# Patient Record
Sex: FEMALE | Race: WHITE | NOT HISPANIC OR LATINO | Employment: OTHER | URBAN - METROPOLITAN AREA
[De-identification: names, ages, dates, MRNs, and addresses within clinical notes are randomized per-mention and may not be internally consistent; named-entity substitution may affect disease eponyms.]

---

## 2018-05-20 ENCOUNTER — HOSPITAL ENCOUNTER (INPATIENT)
Facility: HOSPITAL | Age: 73
LOS: 9 days | Discharge: NURSING, REHAB OR LONG-TERM CARE | End: 2018-05-30
Attending: EMERGENCY MEDICINE | Admitting: FAMILY MEDICINE

## 2018-05-20 ENCOUNTER — APPOINTMENT (OUTPATIENT)
Dept: GENERAL RADIOLOGY | Facility: HOSPITAL | Age: 73
End: 2018-05-20

## 2018-05-20 DIAGNOSIS — I48.91 ATRIAL FIBRILLATION WITH RVR (HCC): ICD-10-CM

## 2018-05-20 DIAGNOSIS — Z74.09 IMPAIRED MOBILITY AND ADLS: ICD-10-CM

## 2018-05-20 DIAGNOSIS — J44.1 CHRONIC OBSTRUCTIVE PULMONARY DISEASE WITH ACUTE EXACERBATION (HCC): Primary | ICD-10-CM

## 2018-05-20 DIAGNOSIS — Z78.9 IMPAIRED MOBILITY AND ADLS: ICD-10-CM

## 2018-05-20 LAB
ALBUMIN SERPL-MCNC: 4.3 G/DL (ref 3.2–4.8)
ALBUMIN/GLOB SERPL: 1.3 G/DL (ref 1.5–2.5)
ALP SERPL-CCNC: 71 U/L (ref 25–100)
ALT SERPL W P-5'-P-CCNC: 12 U/L (ref 7–40)
ANION GAP SERPL CALCULATED.3IONS-SCNC: 10 MMOL/L (ref 3–11)
ARTERIAL PATENCY WRIST A: ABNORMAL
AST SERPL-CCNC: 19 U/L (ref 0–33)
ATMOSPHERIC PRESS: ABNORMAL MMHG
BASE EXCESS BLDA CALC-SCNC: 13.1 MMOL/L (ref 0–2)
BASOPHILS # BLD AUTO: 0.01 10*3/MM3 (ref 0–0.2)
BASOPHILS NFR BLD AUTO: 0.1 % (ref 0–1)
BDY SITE: ABNORMAL
BILIRUB SERPL-MCNC: 0.4 MG/DL (ref 0.3–1.2)
BNP SERPL-MCNC: 82 PG/ML (ref 0–100)
BUN BLD-MCNC: 12 MG/DL (ref 9–23)
BUN/CREAT SERPL: 20 (ref 7–25)
CALCIUM SPEC-SCNC: 9.2 MG/DL (ref 8.7–10.4)
CHLORIDE SERPL-SCNC: 90 MMOL/L (ref 99–109)
CO2 BLDA-SCNC: 46.2 MMOL/L (ref 22–33)
CO2 SERPL-SCNC: 38 MMOL/L (ref 20–31)
COHGB MFR BLD: 1.4 % (ref 0–2)
CREAT BLD-MCNC: 0.6 MG/DL (ref 0.6–1.3)
DEPRECATED RDW RBC AUTO: 49.1 FL (ref 37–54)
EOSINOPHIL # BLD AUTO: 0.11 10*3/MM3 (ref 0–0.3)
EOSINOPHIL NFR BLD AUTO: 1 % (ref 0–3)
ERYTHROCYTE [DISTWIDTH] IN BLOOD BY AUTOMATED COUNT: 13.5 % (ref 11.3–14.5)
GAS FLOW AIRWAY: 6 LPM
GFR SERPL CREATININE-BSD FRML MDRD: 98 ML/MIN/1.73
GLOBULIN UR ELPH-MCNC: 3.3 GM/DL
GLUCOSE BLD-MCNC: 105 MG/DL (ref 70–100)
HCO3 BLDA-SCNC: 43.5 MMOL/L (ref 20–26)
HCT VFR BLD AUTO: 41.5 % (ref 34.5–44)
HCT VFR BLD CALC: 41.2 %
HGB BLD-MCNC: 13.2 G/DL (ref 11.5–15.5)
HGB BLDA-MCNC: 13.4 G/DL (ref 14–18)
HOROWITZ INDEX BLD+IHG-RTO: 44 %
IMM GRANULOCYTES # BLD: 0.03 10*3/MM3 (ref 0–0.03)
IMM GRANULOCYTES NFR BLD: 0.3 % (ref 0–0.6)
INR PPP: 1.01 (ref 0.91–1.09)
LYMPHOCYTES # BLD AUTO: 2.18 10*3/MM3 (ref 0.6–4.8)
LYMPHOCYTES NFR BLD AUTO: 19.8 % (ref 24–44)
MCH RBC QN AUTO: 31.6 PG (ref 27–31)
MCHC RBC AUTO-ENTMCNC: 31.8 G/DL (ref 32–36)
MCV RBC AUTO: 99.3 FL (ref 80–99)
METHGB BLD QL: 1.1 % (ref 0–1.5)
MODALITY: ABNORMAL
MONOCYTES # BLD AUTO: 1.83 10*3/MM3 (ref 0–1)
MONOCYTES NFR BLD AUTO: 16.6 % (ref 0–12)
NEUTROPHILS # BLD AUTO: 6.85 10*3/MM3 (ref 1.5–8.3)
NEUTROPHILS NFR BLD AUTO: 62.2 % (ref 41–71)
OXYHGB MFR BLDV: 93.7 % (ref 94–99)
PCO2 BLDA: 87.9 MM HG (ref 35–45)
PH BLDA: 7.3 PH UNITS (ref 7.35–7.45)
PLATELET # BLD AUTO: 174 10*3/MM3 (ref 150–450)
PMV BLD AUTO: 10.4 FL (ref 6–12)
PO2 BLDA: 90.9 MM HG (ref 83–108)
POTASSIUM BLD-SCNC: 4 MMOL/L (ref 3.5–5.5)
PROCALCITONIN SERPL-MCNC: <0.05 NG/ML
PROT SERPL-MCNC: 7.6 G/DL (ref 5.7–8.2)
PROTHROMBIN TIME: 10.6 SECONDS (ref 9.6–11.5)
RBC # BLD AUTO: 4.18 10*6/MM3 (ref 3.89–5.14)
SODIUM BLD-SCNC: 138 MMOL/L (ref 132–146)
TROPONIN I SERPL-MCNC: 0 NG/ML (ref 0–0.07)
WBC NRBC COR # BLD: 11.01 10*3/MM3 (ref 3.5–10.8)

## 2018-05-20 PROCEDURE — 71045 X-RAY EXAM CHEST 1 VIEW: CPT

## 2018-05-20 PROCEDURE — 83880 ASSAY OF NATRIURETIC PEPTIDE: CPT | Performed by: EMERGENCY MEDICINE

## 2018-05-20 PROCEDURE — 94644 CONT INHLJ TX 1ST HOUR: CPT

## 2018-05-20 PROCEDURE — 25010000002 METHYLPREDNISOLONE PER 40 MG: Performed by: EMERGENCY MEDICINE

## 2018-05-20 PROCEDURE — 94799 UNLISTED PULMONARY SVC/PX: CPT

## 2018-05-20 PROCEDURE — 99285 EMERGENCY DEPT VISIT HI MDM: CPT

## 2018-05-20 PROCEDURE — 25010000002 CEFTRIAXONE PER 250 MG: Performed by: EMERGENCY MEDICINE

## 2018-05-20 PROCEDURE — 85610 PROTHROMBIN TIME: CPT | Performed by: EMERGENCY MEDICINE

## 2018-05-20 PROCEDURE — 82805 BLOOD GASES W/O2 SATURATION: CPT | Performed by: EMERGENCY MEDICINE

## 2018-05-20 PROCEDURE — 94660 CPAP INITIATION&MGMT: CPT

## 2018-05-20 PROCEDURE — 36600 WITHDRAWAL OF ARTERIAL BLOOD: CPT | Performed by: EMERGENCY MEDICINE

## 2018-05-20 PROCEDURE — 80053 COMPREHEN METABOLIC PANEL: CPT | Performed by: EMERGENCY MEDICINE

## 2018-05-20 PROCEDURE — 5A09457 ASSISTANCE WITH RESPIRATORY VENTILATION, 24-96 CONSECUTIVE HOURS, CONTINUOUS POSITIVE AIRWAY PRESSURE: ICD-10-PCS | Performed by: EMERGENCY MEDICINE

## 2018-05-20 PROCEDURE — 93005 ELECTROCARDIOGRAM TRACING: CPT | Performed by: EMERGENCY MEDICINE

## 2018-05-20 PROCEDURE — 85025 COMPLETE CBC W/AUTO DIFF WBC: CPT | Performed by: EMERGENCY MEDICINE

## 2018-05-20 PROCEDURE — 94640 AIRWAY INHALATION TREATMENT: CPT

## 2018-05-20 PROCEDURE — 84484 ASSAY OF TROPONIN QUANT: CPT

## 2018-05-20 PROCEDURE — 87040 BLOOD CULTURE FOR BACTERIA: CPT | Performed by: EMERGENCY MEDICINE

## 2018-05-20 PROCEDURE — 84145 PROCALCITONIN (PCT): CPT | Performed by: EMERGENCY MEDICINE

## 2018-05-20 RX ORDER — CELECOXIB 100 MG/1
200 CAPSULE ORAL DAILY
COMMUNITY

## 2018-05-20 RX ORDER — LEVOFLOXACIN 5 MG/ML
750 INJECTION, SOLUTION INTRAVENOUS ONCE
Status: COMPLETED | OUTPATIENT
Start: 2018-05-20 | End: 2018-05-21

## 2018-05-20 RX ORDER — BUDESONIDE AND FORMOTEROL FUMARATE DIHYDRATE 160; 4.5 UG/1; UG/1
2 AEROSOL RESPIRATORY (INHALATION)
Status: ON HOLD | COMMUNITY
End: 2022-01-01

## 2018-05-20 RX ORDER — ALBUTEROL SULFATE 2.5 MG/3ML
SOLUTION RESPIRATORY (INHALATION)
Status: COMPLETED
Start: 2018-05-20 | End: 2018-05-20

## 2018-05-20 RX ORDER — DULOXETIN HYDROCHLORIDE 20 MG/1
20 CAPSULE, DELAYED RELEASE ORAL DAILY
Status: ON HOLD | COMMUNITY
End: 2022-01-01

## 2018-05-20 RX ORDER — ALBUTEROL SULFATE 2.5 MG/3ML
10 SOLUTION RESPIRATORY (INHALATION) CONTINUOUS
Status: ACTIVE | OUTPATIENT
Start: 2018-05-20 | End: 2018-05-20

## 2018-05-20 RX ORDER — METHYLPREDNISOLONE SODIUM SUCCINATE 40 MG/ML
60 INJECTION, POWDER, LYOPHILIZED, FOR SOLUTION INTRAMUSCULAR; INTRAVENOUS ONCE
Status: COMPLETED | OUTPATIENT
Start: 2018-05-20 | End: 2018-05-20

## 2018-05-20 RX ORDER — ALBUTEROL SULFATE 2.5 MG/3ML
2.5 SOLUTION RESPIRATORY (INHALATION) EVERY 4 HOURS PRN
Status: ON HOLD | COMMUNITY
End: 2018-05-24

## 2018-05-20 RX ORDER — CEFTRIAXONE SODIUM 1 G/50ML
1 INJECTION, SOLUTION INTRAVENOUS ONCE
Status: COMPLETED | OUTPATIENT
Start: 2018-05-20 | End: 2018-05-20

## 2018-05-20 RX ADMIN — ALBUTEROL SULFATE 2.5 MG: 2.5 SOLUTION RESPIRATORY (INHALATION) at 22:00

## 2018-05-20 RX ADMIN — METHYLPREDNISOLONE SODIUM SUCCINATE 60 MG: 40 INJECTION, POWDER, FOR SOLUTION INTRAMUSCULAR; INTRAVENOUS at 21:43

## 2018-05-20 RX ADMIN — CEFTRIAXONE SODIUM 1 G: 1 INJECTION, SOLUTION INTRAVENOUS at 22:52

## 2018-05-21 ENCOUNTER — APPOINTMENT (OUTPATIENT)
Dept: GENERAL RADIOLOGY | Facility: HOSPITAL | Age: 73
End: 2018-05-21

## 2018-05-21 PROBLEM — J44.1 CHRONIC OBSTRUCTIVE PULMONARY DISEASE WITH ACUTE EXACERBATION: Status: ACTIVE | Noted: 2018-05-21

## 2018-05-21 PROBLEM — Z72.0 TOBACCO ABUSE: Status: ACTIVE | Noted: 2018-05-21

## 2018-05-21 PROBLEM — J96.22 ACUTE ON CHRONIC RESPIRATORY FAILURE WITH HYPOXIA AND HYPERCAPNIA: Status: ACTIVE | Noted: 2018-05-21

## 2018-05-21 PROBLEM — J96.21 ACUTE ON CHRONIC RESPIRATORY FAILURE WITH HYPOXIA AND HYPERCAPNIA: Status: ACTIVE | Noted: 2018-05-21

## 2018-05-21 LAB
ARTERIAL PATENCY WRIST A: POSITIVE
ATMOSPHERIC PRESS: ABNORMAL MMHG
BASE EXCESS BLDA CALC-SCNC: 12.4 MMOL/L (ref 0–2)
BDY SITE: ABNORMAL
CO2 BLDA-SCNC: 44.7 MMOL/L (ref 22–33)
COHGB MFR BLD: 0.9 % (ref 0–2)
EPAP: 6
HCO3 BLDA-SCNC: 42.2 MMOL/L (ref 20–26)
HCT VFR BLD CALC: 39.2 %
HGB BLDA-MCNC: 12.8 G/DL (ref 14–18)
HOROWITZ INDEX BLD+IHG-RTO: 40 %
IPAP: 16
METHGB BLD QL: 0.9 % (ref 0–1.5)
MODALITY: ABNORMAL
OXYHGB MFR BLDV: 96.7 % (ref 94–99)
PCO2 BLDA: 83.2 MM HG (ref 35–45)
PH BLDA: 7.31 PH UNITS (ref 7.35–7.45)
PO2 BLDA: 118 MM HG (ref 83–108)
SAO2 % BLDCOA: 96.7 %

## 2018-05-21 PROCEDURE — 94640 AIRWAY INHALATION TREATMENT: CPT

## 2018-05-21 PROCEDURE — 25010000002 LORAZEPAM PER 2 MG: Performed by: INTERNAL MEDICINE

## 2018-05-21 PROCEDURE — 94799 UNLISTED PULMONARY SVC/PX: CPT

## 2018-05-21 PROCEDURE — 36600 WITHDRAWAL OF ARTERIAL BLOOD: CPT | Performed by: INTERNAL MEDICINE

## 2018-05-21 PROCEDURE — 99223 1ST HOSP IP/OBS HIGH 75: CPT | Performed by: INTERNAL MEDICINE

## 2018-05-21 PROCEDURE — 25010000002 ENOXAPARIN PER 10 MG: Performed by: INTERNAL MEDICINE

## 2018-05-21 PROCEDURE — 71045 X-RAY EXAM CHEST 1 VIEW: CPT

## 2018-05-21 PROCEDURE — 82805 BLOOD GASES W/O2 SATURATION: CPT | Performed by: INTERNAL MEDICINE

## 2018-05-21 PROCEDURE — 25010000002 LORAZEPAM PER 2 MG: Performed by: NURSE PRACTITIONER

## 2018-05-21 PROCEDURE — 94660 CPAP INITIATION&MGMT: CPT

## 2018-05-21 PROCEDURE — 25010000002 METHYLPREDNISOLONE PER 40 MG: Performed by: INTERNAL MEDICINE

## 2018-05-21 PROCEDURE — 25010000002 LEVOFLOXACIN PER 250 MG: Performed by: EMERGENCY MEDICINE

## 2018-05-21 PROCEDURE — 94760 N-INVAS EAR/PLS OXIMETRY 1: CPT

## 2018-05-21 RX ORDER — BUDESONIDE AND FORMOTEROL FUMARATE DIHYDRATE 160; 4.5 UG/1; UG/1
2 AEROSOL RESPIRATORY (INHALATION)
Status: DISCONTINUED | OUTPATIENT
Start: 2018-05-21 | End: 2018-05-30 | Stop reason: HOSPADM

## 2018-05-21 RX ORDER — LORAZEPAM 2 MG/ML
0.5 INJECTION INTRAMUSCULAR ONCE
Status: COMPLETED | OUTPATIENT
Start: 2018-05-21 | End: 2018-05-21

## 2018-05-21 RX ORDER — LEVOFLOXACIN 750 MG/1
750 TABLET ORAL EVERY 24 HOURS
Status: COMPLETED | OUTPATIENT
Start: 2018-05-21 | End: 2018-05-26

## 2018-05-21 RX ORDER — CELECOXIB 100 MG/1
100 CAPSULE ORAL 2 TIMES DAILY PRN
Status: DISCONTINUED | OUTPATIENT
Start: 2018-05-21 | End: 2018-05-30 | Stop reason: HOSPADM

## 2018-05-21 RX ORDER — ALBUTEROL SULFATE 2.5 MG/3ML
2.5 SOLUTION RESPIRATORY (INHALATION) EVERY 4 HOURS PRN
Status: DISCONTINUED | OUTPATIENT
Start: 2018-05-21 | End: 2018-05-30 | Stop reason: HOSPADM

## 2018-05-21 RX ORDER — IPRATROPIUM BROMIDE AND ALBUTEROL SULFATE 2.5; .5 MG/3ML; MG/3ML
3 SOLUTION RESPIRATORY (INHALATION)
Status: DISCONTINUED | OUTPATIENT
Start: 2018-05-21 | End: 2018-05-30 | Stop reason: HOSPADM

## 2018-05-21 RX ORDER — CEFTRIAXONE SODIUM 1 G/50ML
1 INJECTION, SOLUTION INTRAVENOUS
Status: DISCONTINUED | OUTPATIENT
Start: 2018-05-21 | End: 2018-05-21

## 2018-05-21 RX ORDER — LORAZEPAM 2 MG/ML
1 INJECTION INTRAMUSCULAR ONCE
Status: DISCONTINUED | OUTPATIENT
Start: 2018-05-21 | End: 2018-05-21

## 2018-05-21 RX ORDER — METHYLPREDNISOLONE SODIUM SUCCINATE 40 MG/ML
40 INJECTION, POWDER, LYOPHILIZED, FOR SOLUTION INTRAMUSCULAR; INTRAVENOUS EVERY 8 HOURS
Status: DISCONTINUED | OUTPATIENT
Start: 2018-05-21 | End: 2018-05-24

## 2018-05-21 RX ORDER — LORAZEPAM 2 MG/ML
0.5 INJECTION INTRAMUSCULAR EVERY 6 HOURS PRN
Status: DISCONTINUED | OUTPATIENT
Start: 2018-05-21 | End: 2018-05-23

## 2018-05-21 RX ORDER — GUAIFENESIN/DEXTROMETHORPHAN 100-10MG/5
10 SYRUP ORAL EVERY 6 HOURS PRN
Status: DISCONTINUED | OUTPATIENT
Start: 2018-05-21 | End: 2018-05-30 | Stop reason: HOSPADM

## 2018-05-21 RX ORDER — DULOXETIN HYDROCHLORIDE 20 MG/1
20 CAPSULE, DELAYED RELEASE ORAL DAILY
Status: DISCONTINUED | OUTPATIENT
Start: 2018-05-22 | End: 2018-05-30 | Stop reason: HOSPADM

## 2018-05-21 RX ORDER — ACETAMINOPHEN 325 MG/1
650 TABLET ORAL EVERY 6 HOURS PRN
Status: DISCONTINUED | OUTPATIENT
Start: 2018-05-21 | End: 2018-05-30 | Stop reason: HOSPADM

## 2018-05-21 RX ORDER — IPRATROPIUM BROMIDE AND ALBUTEROL SULFATE 2.5; .5 MG/3ML; MG/3ML
3 SOLUTION RESPIRATORY (INHALATION) EVERY 4 HOURS PRN
Status: DISCONTINUED | OUTPATIENT
Start: 2018-05-21 | End: 2018-05-30 | Stop reason: HOSPADM

## 2018-05-21 RX ADMIN — METHYLPREDNISOLONE SODIUM SUCCINATE 40 MG: 40 INJECTION, POWDER, FOR SOLUTION INTRAMUSCULAR; INTRAVENOUS at 09:26

## 2018-05-21 RX ADMIN — IPRATROPIUM BROMIDE AND ALBUTEROL SULFATE 3 ML: 2.5; .5 SOLUTION RESPIRATORY (INHALATION) at 07:01

## 2018-05-21 RX ADMIN — LEVOFLOXACIN 750 MG: 5 INJECTION, SOLUTION INTRAVENOUS at 00:08

## 2018-05-21 RX ADMIN — LORAZEPAM 0.5 MG: 2 INJECTION INTRAMUSCULAR; INTRAVENOUS at 20:41

## 2018-05-21 RX ADMIN — LORAZEPAM 0.5 MG: 2 INJECTION INTRAMUSCULAR; INTRAVENOUS at 01:02

## 2018-05-21 RX ADMIN — LEVOFLOXACIN 750 MG: 750 TABLET, FILM COATED ORAL at 14:29

## 2018-05-21 RX ADMIN — ACETAMINOPHEN 650 MG: 325 TABLET ORAL at 20:43

## 2018-05-21 RX ADMIN — IPRATROPIUM BROMIDE AND ALBUTEROL SULFATE 3 ML: 2.5; .5 SOLUTION RESPIRATORY (INHALATION) at 23:28

## 2018-05-21 RX ADMIN — GUAIFENESIN AND DEXTROMETHORPHAN 10 ML: 100; 10 SYRUP ORAL at 21:31

## 2018-05-21 RX ADMIN — IPRATROPIUM BROMIDE AND ALBUTEROL SULFATE 3 ML: 2.5; .5 SOLUTION RESPIRATORY (INHALATION) at 15:50

## 2018-05-21 RX ADMIN — BUDESONIDE AND FORMOTEROL FUMARATE DIHYDRATE 2 PUFF: 160; 4.5 AEROSOL RESPIRATORY (INHALATION) at 23:28

## 2018-05-21 RX ADMIN — METHYLPREDNISOLONE SODIUM SUCCINATE 40 MG: 40 INJECTION, POWDER, FOR SOLUTION INTRAMUSCULAR; INTRAVENOUS at 17:06

## 2018-05-21 RX ADMIN — IPRATROPIUM BROMIDE AND ALBUTEROL SULFATE 3 ML: 2.5; .5 SOLUTION RESPIRATORY (INHALATION) at 10:37

## 2018-05-21 RX ADMIN — METHYLPREDNISOLONE SODIUM SUCCINATE 40 MG: 40 INJECTION, POWDER, FOR SOLUTION INTRAMUSCULAR; INTRAVENOUS at 02:17

## 2018-05-21 RX ADMIN — IPRATROPIUM BROMIDE AND ALBUTEROL SULFATE 3 ML: 2.5; .5 SOLUTION RESPIRATORY (INHALATION) at 02:59

## 2018-05-21 RX ADMIN — IPRATROPIUM BROMIDE AND ALBUTEROL SULFATE 3 ML: 2.5; .5 SOLUTION RESPIRATORY (INHALATION) at 19:07

## 2018-05-21 RX ADMIN — ENOXAPARIN SODIUM 40 MG: 40 INJECTION SUBCUTANEOUS at 14:28

## 2018-05-21 RX ADMIN — LORAZEPAM 0.5 MG: 2 INJECTION INTRAMUSCULAR; INTRAVENOUS at 21:35

## 2018-05-21 RX ADMIN — METOPROLOL TARTRATE 25 MG: 25 TABLET ORAL at 20:41

## 2018-05-22 LAB
ANION GAP SERPL CALCULATED.3IONS-SCNC: 8 MMOL/L (ref 3–11)
BUN BLD-MCNC: 17 MG/DL (ref 9–23)
BUN/CREAT SERPL: 34 (ref 7–25)
CALCIUM SPEC-SCNC: 8.7 MG/DL (ref 8.7–10.4)
CHLORIDE SERPL-SCNC: 91 MMOL/L (ref 99–109)
CO2 SERPL-SCNC: 38 MMOL/L (ref 20–31)
CREAT BLD-MCNC: 0.5 MG/DL (ref 0.6–1.3)
DEPRECATED RDW RBC AUTO: 48.4 FL (ref 37–54)
ERYTHROCYTE [DISTWIDTH] IN BLOOD BY AUTOMATED COUNT: 13.3 % (ref 11.3–14.5)
GFR SERPL CREATININE-BSD FRML MDRD: 121 ML/MIN/1.73
GLUCOSE BLD-MCNC: 115 MG/DL (ref 70–100)
HCT VFR BLD AUTO: 39.3 % (ref 34.5–44)
HGB BLD-MCNC: 12.4 G/DL (ref 11.5–15.5)
MCH RBC QN AUTO: 31.6 PG (ref 27–31)
MCHC RBC AUTO-ENTMCNC: 31.6 G/DL (ref 32–36)
MCV RBC AUTO: 100 FL (ref 80–99)
PLATELET # BLD AUTO: 175 10*3/MM3 (ref 150–450)
PMV BLD AUTO: 10.3 FL (ref 6–12)
POTASSIUM BLD-SCNC: 4.3 MMOL/L (ref 3.5–5.5)
RBC # BLD AUTO: 3.93 10*6/MM3 (ref 3.89–5.14)
SODIUM BLD-SCNC: 137 MMOL/L (ref 132–146)
WBC NRBC COR # BLD: 9.76 10*3/MM3 (ref 3.5–10.8)

## 2018-05-22 PROCEDURE — 94799 UNLISTED PULMONARY SVC/PX: CPT

## 2018-05-22 PROCEDURE — 85027 COMPLETE CBC AUTOMATED: CPT | Performed by: INTERNAL MEDICINE

## 2018-05-22 PROCEDURE — 80048 BASIC METABOLIC PNL TOTAL CA: CPT | Performed by: INTERNAL MEDICINE

## 2018-05-22 PROCEDURE — 99232 SBSQ HOSP IP/OBS MODERATE 35: CPT | Performed by: INTERNAL MEDICINE

## 2018-05-22 PROCEDURE — 25010000002 METHYLPREDNISOLONE PER 40 MG: Performed by: INTERNAL MEDICINE

## 2018-05-22 PROCEDURE — 94760 N-INVAS EAR/PLS OXIMETRY 1: CPT

## 2018-05-22 PROCEDURE — 94640 AIRWAY INHALATION TREATMENT: CPT

## 2018-05-22 PROCEDURE — 99233 SBSQ HOSP IP/OBS HIGH 50: CPT | Performed by: INTERNAL MEDICINE

## 2018-05-22 PROCEDURE — 94644 CONT INHLJ TX 1ST HOUR: CPT

## 2018-05-22 PROCEDURE — 25010000002 ENOXAPARIN PER 10 MG: Performed by: INTERNAL MEDICINE

## 2018-05-22 PROCEDURE — 25010000002 LORAZEPAM PER 2 MG: Performed by: INTERNAL MEDICINE

## 2018-05-22 PROCEDURE — 25010000002 FUROSEMIDE PER 20 MG: Performed by: INTERNAL MEDICINE

## 2018-05-22 RX ORDER — ROFLUMILAST 500 UG/1
500 TABLET ORAL DAILY
Status: DISCONTINUED | OUTPATIENT
Start: 2018-05-22 | End: 2018-05-30 | Stop reason: HOSPADM

## 2018-05-22 RX ORDER — FUROSEMIDE 10 MG/ML
60 INJECTION INTRAMUSCULAR; INTRAVENOUS ONCE
Status: COMPLETED | OUTPATIENT
Start: 2018-05-22 | End: 2018-05-22

## 2018-05-22 RX ADMIN — METHYLPREDNISOLONE SODIUM SUCCINATE 40 MG: 40 INJECTION, POWDER, FOR SOLUTION INTRAMUSCULAR; INTRAVENOUS at 09:31

## 2018-05-22 RX ADMIN — ROFLUMILAST 500 MCG: 500 TABLET ORAL at 12:44

## 2018-05-22 RX ADMIN — IPRATROPIUM BROMIDE AND ALBUTEROL SULFATE 3 ML: 2.5; .5 SOLUTION RESPIRATORY (INHALATION) at 08:31

## 2018-05-22 RX ADMIN — IPRATROPIUM BROMIDE AND ALBUTEROL SULFATE 3 ML: 2.5; .5 SOLUTION RESPIRATORY (INHALATION) at 03:10

## 2018-05-22 RX ADMIN — METOPROLOL TARTRATE 25 MG: 25 TABLET ORAL at 08:45

## 2018-05-22 RX ADMIN — METHYLPREDNISOLONE SODIUM SUCCINATE 40 MG: 40 INJECTION, POWDER, FOR SOLUTION INTRAMUSCULAR; INTRAVENOUS at 02:24

## 2018-05-22 RX ADMIN — IPRATROPIUM BROMIDE AND ALBUTEROL SULFATE 3 ML: 2.5; .5 SOLUTION RESPIRATORY (INHALATION) at 13:03

## 2018-05-22 RX ADMIN — ACETAMINOPHEN 650 MG: 325 TABLET ORAL at 14:54

## 2018-05-22 RX ADMIN — METOPROLOL TARTRATE 25 MG: 25 TABLET ORAL at 20:18

## 2018-05-22 RX ADMIN — DULOXETINE HYDROCHLORIDE 20 MG: 20 CAPSULE, DELAYED RELEASE ORAL at 08:45

## 2018-05-22 RX ADMIN — GUAIFENESIN AND DEXTROMETHORPHAN 10 ML: 100; 10 SYRUP ORAL at 14:54

## 2018-05-22 RX ADMIN — LORAZEPAM 0.5 MG: 2 INJECTION INTRAMUSCULAR; INTRAVENOUS at 20:22

## 2018-05-22 RX ADMIN — LEVOFLOXACIN 750 MG: 750 TABLET, FILM COATED ORAL at 12:44

## 2018-05-22 RX ADMIN — IPRATROPIUM BROMIDE AND ALBUTEROL SULFATE 3 ML: 2.5; .5 SOLUTION RESPIRATORY (INHALATION) at 19:31

## 2018-05-22 RX ADMIN — ACETAMINOPHEN 650 MG: 325 TABLET ORAL at 20:17

## 2018-05-22 RX ADMIN — FUROSEMIDE 60 MG: 10 INJECTION, SOLUTION INTRAMUSCULAR; INTRAVENOUS at 09:31

## 2018-05-22 RX ADMIN — IPRATROPIUM BROMIDE AND ALBUTEROL SULFATE 3 ML: 2.5; .5 SOLUTION RESPIRATORY (INHALATION) at 16:01

## 2018-05-22 RX ADMIN — METHYLPREDNISOLONE SODIUM SUCCINATE 40 MG: 40 INJECTION, POWDER, FOR SOLUTION INTRAMUSCULAR; INTRAVENOUS at 17:23

## 2018-05-22 RX ADMIN — BUDESONIDE AND FORMOTEROL FUMARATE DIHYDRATE 2 PUFF: 160; 4.5 AEROSOL RESPIRATORY (INHALATION) at 19:32

## 2018-05-22 RX ADMIN — BUDESONIDE AND FORMOTEROL FUMARATE DIHYDRATE 2 PUFF: 160; 4.5 AEROSOL RESPIRATORY (INHALATION) at 08:31

## 2018-05-22 RX ADMIN — ENOXAPARIN SODIUM 40 MG: 40 INJECTION SUBCUTANEOUS at 14:54

## 2018-05-22 RX ADMIN — LORAZEPAM 0.5 MG: 2 INJECTION INTRAMUSCULAR; INTRAVENOUS at 14:55

## 2018-05-22 RX ADMIN — GUAIFENESIN AND DEXTROMETHORPHAN 10 ML: 100; 10 SYRUP ORAL at 20:23

## 2018-05-23 ENCOUNTER — APPOINTMENT (OUTPATIENT)
Dept: GENERAL RADIOLOGY | Facility: HOSPITAL | Age: 73
End: 2018-05-23

## 2018-05-23 LAB
ANION GAP SERPL CALCULATED.3IONS-SCNC: 4 MMOL/L (ref 3–11)
BUN BLD-MCNC: 18 MG/DL (ref 9–23)
BUN/CREAT SERPL: 30 (ref 7–25)
CALCIUM SPEC-SCNC: 8.7 MG/DL (ref 8.7–10.4)
CHLORIDE SERPL-SCNC: 87 MMOL/L (ref 99–109)
CO2 SERPL-SCNC: 46 MMOL/L (ref 20–31)
CREAT BLD-MCNC: 0.6 MG/DL (ref 0.6–1.3)
GFR SERPL CREATININE-BSD FRML MDRD: 98 ML/MIN/1.73
GLUCOSE BLD-MCNC: 98 MG/DL (ref 70–100)
POTASSIUM BLD-SCNC: 3.4 MMOL/L (ref 3.5–5.5)
SODIUM BLD-SCNC: 137 MMOL/L (ref 132–146)

## 2018-05-23 PROCEDURE — 25010000002 METHYLPREDNISOLONE PER 40 MG: Performed by: INTERNAL MEDICINE

## 2018-05-23 PROCEDURE — 25010000002 ENOXAPARIN PER 10 MG: Performed by: INTERNAL MEDICINE

## 2018-05-23 PROCEDURE — 99233 SBSQ HOSP IP/OBS HIGH 50: CPT | Performed by: INTERNAL MEDICINE

## 2018-05-23 PROCEDURE — 71045 X-RAY EXAM CHEST 1 VIEW: CPT

## 2018-05-23 PROCEDURE — 94799 UNLISTED PULMONARY SVC/PX: CPT

## 2018-05-23 PROCEDURE — 94640 AIRWAY INHALATION TREATMENT: CPT

## 2018-05-23 PROCEDURE — 97166 OT EVAL MOD COMPLEX 45 MIN: CPT | Performed by: OCCUPATIONAL THERAPIST

## 2018-05-23 PROCEDURE — 80048 BASIC METABOLIC PNL TOTAL CA: CPT | Performed by: INTERNAL MEDICINE

## 2018-05-23 PROCEDURE — 25010000002 FUROSEMIDE PER 20 MG: Performed by: INTERNAL MEDICINE

## 2018-05-23 PROCEDURE — 94660 CPAP INITIATION&MGMT: CPT

## 2018-05-23 PROCEDURE — 94760 N-INVAS EAR/PLS OXIMETRY 1: CPT

## 2018-05-23 PROCEDURE — 25010000002 LORAZEPAM PER 2 MG: Performed by: INTERNAL MEDICINE

## 2018-05-23 RX ORDER — FUROSEMIDE 10 MG/ML
60 INJECTION INTRAMUSCULAR; INTRAVENOUS ONCE
Status: COMPLETED | OUTPATIENT
Start: 2018-05-23 | End: 2018-05-23

## 2018-05-23 RX ORDER — POTASSIUM CHLORIDE 750 MG/1
40 CAPSULE, EXTENDED RELEASE ORAL ONCE
Status: COMPLETED | OUTPATIENT
Start: 2018-05-23 | End: 2018-05-23

## 2018-05-23 RX ADMIN — METOPROLOL TARTRATE 25 MG: 25 TABLET ORAL at 20:44

## 2018-05-23 RX ADMIN — FUROSEMIDE 60 MG: 10 INJECTION, SOLUTION INTRAMUSCULAR; INTRAVENOUS at 14:49

## 2018-05-23 RX ADMIN — LEVOFLOXACIN 750 MG: 750 TABLET, FILM COATED ORAL at 13:07

## 2018-05-23 RX ADMIN — BUDESONIDE AND FORMOTEROL FUMARATE DIHYDRATE 2 PUFF: 160; 4.5 AEROSOL RESPIRATORY (INHALATION) at 19:59

## 2018-05-23 RX ADMIN — ROFLUMILAST 500 MCG: 500 TABLET ORAL at 13:06

## 2018-05-23 RX ADMIN — IPRATROPIUM BROMIDE AND ALBUTEROL SULFATE 3 ML: 2.5; .5 SOLUTION RESPIRATORY (INHALATION) at 06:46

## 2018-05-23 RX ADMIN — IPRATROPIUM BROMIDE AND ALBUTEROL SULFATE 3 ML: 2.5; .5 SOLUTION RESPIRATORY (INHALATION) at 04:12

## 2018-05-23 RX ADMIN — ENOXAPARIN SODIUM 40 MG: 40 INJECTION SUBCUTANEOUS at 14:49

## 2018-05-23 RX ADMIN — IPRATROPIUM BROMIDE AND ALBUTEROL SULFATE 3 ML: 2.5; .5 SOLUTION RESPIRATORY (INHALATION) at 10:30

## 2018-05-23 RX ADMIN — ACETAMINOPHEN 650 MG: 325 TABLET ORAL at 09:28

## 2018-05-23 RX ADMIN — IPRATROPIUM BROMIDE AND ALBUTEROL SULFATE 3 ML: 2.5; .5 SOLUTION RESPIRATORY (INHALATION) at 14:31

## 2018-05-23 RX ADMIN — BUDESONIDE AND FORMOTEROL FUMARATE DIHYDRATE 2 PUFF: 160; 4.5 AEROSOL RESPIRATORY (INHALATION) at 06:46

## 2018-05-23 RX ADMIN — METHYLPREDNISOLONE SODIUM SUCCINATE 40 MG: 40 INJECTION, POWDER, FOR SOLUTION INTRAMUSCULAR; INTRAVENOUS at 17:03

## 2018-05-23 RX ADMIN — POTASSIUM CHLORIDE 40 MEQ: 750 CAPSULE, EXTENDED RELEASE ORAL at 09:25

## 2018-05-23 RX ADMIN — IPRATROPIUM BROMIDE AND ALBUTEROL SULFATE 3 ML: 2.5; .5 SOLUTION RESPIRATORY (INHALATION) at 19:59

## 2018-05-23 RX ADMIN — LORAZEPAM 0.5 MG: 2 INJECTION INTRAMUSCULAR; INTRAVENOUS at 04:00

## 2018-05-23 RX ADMIN — DULOXETINE HYDROCHLORIDE 20 MG: 20 CAPSULE, DELAYED RELEASE ORAL at 09:25

## 2018-05-23 RX ADMIN — METHYLPREDNISOLONE SODIUM SUCCINATE 40 MG: 40 INJECTION, POWDER, FOR SOLUTION INTRAMUSCULAR; INTRAVENOUS at 09:25

## 2018-05-23 RX ADMIN — METOPROLOL TARTRATE 25 MG: 25 TABLET ORAL at 09:25

## 2018-05-23 RX ADMIN — IPRATROPIUM BROMIDE AND ALBUTEROL SULFATE 3 ML: 2.5; .5 SOLUTION RESPIRATORY (INHALATION) at 23:36

## 2018-05-23 RX ADMIN — METHYLPREDNISOLONE SODIUM SUCCINATE 40 MG: 40 INJECTION, POWDER, FOR SOLUTION INTRAMUSCULAR; INTRAVENOUS at 04:00

## 2018-05-24 LAB
ALBUMIN SERPL-MCNC: 4.1 G/DL (ref 3.2–4.8)
ALBUMIN/GLOB SERPL: 1.4 G/DL (ref 1.5–2.5)
ALP SERPL-CCNC: 60 U/L (ref 25–100)
ALT SERPL W P-5'-P-CCNC: 14 U/L (ref 7–40)
ANION GAP SERPL CALCULATED.3IONS-SCNC: 13 MMOL/L (ref 3–11)
ANION GAP SERPL CALCULATED.3IONS-SCNC: 4 MMOL/L (ref 3–11)
AST SERPL-CCNC: 20 U/L (ref 0–33)
BASOPHILS # BLD AUTO: 0.02 10*3/MM3 (ref 0–0.2)
BASOPHILS NFR BLD AUTO: 0.1 % (ref 0–1)
BILIRUB SERPL-MCNC: 0.3 MG/DL (ref 0.3–1.2)
BNP SERPL-MCNC: 43 PG/ML (ref 0–100)
BUN BLD-MCNC: 20 MG/DL (ref 9–23)
BUN BLD-MCNC: 20 MG/DL (ref 9–23)
BUN/CREAT SERPL: 28.6 (ref 7–25)
BUN/CREAT SERPL: 33.3 (ref 7–25)
CALCIUM SPEC-SCNC: 9 MG/DL (ref 8.7–10.4)
CALCIUM SPEC-SCNC: 9.5 MG/DL (ref 8.7–10.4)
CHLORIDE SERPL-SCNC: 83 MMOL/L (ref 99–109)
CHLORIDE SERPL-SCNC: 84 MMOL/L (ref 99–109)
CO2 SERPL-SCNC: 40 MMOL/L (ref 20–31)
CO2 SERPL-SCNC: 50 MMOL/L (ref 20–31)
CREAT BLD-MCNC: 0.6 MG/DL (ref 0.6–1.3)
CREAT BLD-MCNC: 0.7 MG/DL (ref 0.6–1.3)
DEPRECATED RDW RBC AUTO: 47.3 FL (ref 37–54)
EOSINOPHIL # BLD AUTO: 0.01 10*3/MM3 (ref 0–0.3)
EOSINOPHIL NFR BLD AUTO: 0.1 % (ref 0–3)
ERYTHROCYTE [DISTWIDTH] IN BLOOD BY AUTOMATED COUNT: 13 % (ref 11.3–14.5)
GFR SERPL CREATININE-BSD FRML MDRD: 82 ML/MIN/1.73
GFR SERPL CREATININE-BSD FRML MDRD: 98 ML/MIN/1.73
GLOBULIN UR ELPH-MCNC: 2.9 GM/DL
GLUCOSE BLD-MCNC: 123 MG/DL (ref 70–100)
GLUCOSE BLD-MCNC: 129 MG/DL (ref 70–100)
HCT VFR BLD AUTO: 44.2 % (ref 34.5–44)
HGB BLD-MCNC: 14.1 G/DL (ref 11.5–15.5)
IMM GRANULOCYTES # BLD: 0.06 10*3/MM3 (ref 0–0.03)
IMM GRANULOCYTES NFR BLD: 0.4 % (ref 0–0.6)
LYMPHOCYTES # BLD AUTO: 2.44 10*3/MM3 (ref 0.6–4.8)
LYMPHOCYTES NFR BLD AUTO: 15.2 % (ref 24–44)
MAGNESIUM SERPL-MCNC: 2 MG/DL (ref 1.3–2.7)
MCH RBC QN AUTO: 31.7 PG (ref 27–31)
MCHC RBC AUTO-ENTMCNC: 31.9 G/DL (ref 32–36)
MCV RBC AUTO: 99.3 FL (ref 80–99)
MONOCYTES # BLD AUTO: 2.59 10*3/MM3 (ref 0–1)
MONOCYTES NFR BLD AUTO: 16.1 % (ref 0–12)
NEUTROPHILS # BLD AUTO: 11 10*3/MM3 (ref 1.5–8.3)
NEUTROPHILS NFR BLD AUTO: 68.5 % (ref 41–71)
PLATELET # BLD AUTO: 249 10*3/MM3 (ref 150–450)
PMV BLD AUTO: 10.5 FL (ref 6–12)
POTASSIUM BLD-SCNC: 3.4 MMOL/L (ref 3.5–5.5)
POTASSIUM BLD-SCNC: 4 MMOL/L (ref 3.5–5.5)
PROT SERPL-MCNC: 7 G/DL (ref 5.7–8.2)
RBC # BLD AUTO: 4.45 10*6/MM3 (ref 3.89–5.14)
SODIUM BLD-SCNC: 137 MMOL/L (ref 132–146)
SODIUM BLD-SCNC: 137 MMOL/L (ref 132–146)
TROPONIN I SERPL-MCNC: <0.006 NG/ML
WBC NRBC COR # BLD: 16.06 10*3/MM3 (ref 3.5–10.8)

## 2018-05-24 PROCEDURE — 94760 N-INVAS EAR/PLS OXIMETRY 1: CPT

## 2018-05-24 PROCEDURE — 85025 COMPLETE CBC W/AUTO DIFF WBC: CPT | Performed by: NURSE PRACTITIONER

## 2018-05-24 PROCEDURE — 94799 UNLISTED PULMONARY SVC/PX: CPT

## 2018-05-24 PROCEDURE — 25010000002 ENOXAPARIN PER 10 MG: Performed by: INTERNAL MEDICINE

## 2018-05-24 PROCEDURE — 25010000002 METHYLPREDNISOLONE PER 40 MG: Performed by: INTERNAL MEDICINE

## 2018-05-24 PROCEDURE — 83880 ASSAY OF NATRIURETIC PEPTIDE: CPT | Performed by: NURSE PRACTITIONER

## 2018-05-24 PROCEDURE — 94640 AIRWAY INHALATION TREATMENT: CPT

## 2018-05-24 PROCEDURE — 97161 PT EVAL LOW COMPLEX 20 MIN: CPT

## 2018-05-24 PROCEDURE — 99233 SBSQ HOSP IP/OBS HIGH 50: CPT | Performed by: INTERNAL MEDICINE

## 2018-05-24 PROCEDURE — 25010000002 FUROSEMIDE PER 20 MG: Performed by: INTERNAL MEDICINE

## 2018-05-24 PROCEDURE — 94660 CPAP INITIATION&MGMT: CPT

## 2018-05-24 PROCEDURE — 93010 ELECTROCARDIOGRAM REPORT: CPT | Performed by: INTERNAL MEDICINE

## 2018-05-24 PROCEDURE — 80053 COMPREHEN METABOLIC PANEL: CPT | Performed by: INTERNAL MEDICINE

## 2018-05-24 PROCEDURE — 94644 CONT INHLJ TX 1ST HOUR: CPT

## 2018-05-24 PROCEDURE — 93005 ELECTROCARDIOGRAM TRACING: CPT | Performed by: INTERNAL MEDICINE

## 2018-05-24 PROCEDURE — 83735 ASSAY OF MAGNESIUM: CPT | Performed by: NURSE PRACTITIONER

## 2018-05-24 PROCEDURE — 99291 CRITICAL CARE FIRST HOUR: CPT | Performed by: NURSE PRACTITIONER

## 2018-05-24 PROCEDURE — 99232 SBSQ HOSP IP/OBS MODERATE 35: CPT | Performed by: INTERNAL MEDICINE

## 2018-05-24 PROCEDURE — 97530 THERAPEUTIC ACTIVITIES: CPT | Performed by: OCCUPATIONAL THERAPIST

## 2018-05-24 PROCEDURE — 84484 ASSAY OF TROPONIN QUANT: CPT | Performed by: NURSE PRACTITIONER

## 2018-05-24 RX ORDER — ESMOLOL HYDROCHLORIDE 10 MG/ML
50-300 INJECTION, SOLUTION INTRAVENOUS
Status: DISCONTINUED | OUTPATIENT
Start: 2018-05-24 | End: 2018-05-24

## 2018-05-24 RX ORDER — FUROSEMIDE 10 MG/ML
40 INJECTION INTRAMUSCULAR; INTRAVENOUS ONCE
Status: COMPLETED | OUTPATIENT
Start: 2018-05-24 | End: 2018-05-24

## 2018-05-24 RX ORDER — IPRATROPIUM BROMIDE AND ALBUTEROL SULFATE 2.5; .5 MG/3ML; MG/3ML
3 SOLUTION RESPIRATORY (INHALATION) EVERY 6 HOURS
COMMUNITY

## 2018-05-24 RX ORDER — GUAIFENESIN, PSEUDOEPHEDRINE HYDROCHLORIDE 600; 60 MG/1; MG/1
1 TABLET, EXTENDED RELEASE ORAL EVERY 12 HOURS
COMMUNITY
End: 2018-05-30 | Stop reason: HOSPADM

## 2018-05-24 RX ORDER — PREDNISONE 20 MG/1
40 TABLET ORAL
Status: DISCONTINUED | OUTPATIENT
Start: 2018-05-25 | End: 2018-05-26

## 2018-05-24 RX ORDER — PREDNISONE 10 MG/1
10 TABLET ORAL
Status: DISCONTINUED | OUTPATIENT
Start: 2018-06-02 | End: 2018-05-26

## 2018-05-24 RX ORDER — ESMOLOL HYDROCHLORIDE 10 MG/ML
50-300 INJECTION, SOLUTION INTRAVENOUS
Status: DISCONTINUED | OUTPATIENT
Start: 2018-05-24 | End: 2018-05-28

## 2018-05-24 RX ORDER — PREDNISONE 10 MG/1
5 TABLET ORAL
Status: DISCONTINUED | OUTPATIENT
Start: 2018-06-05 | End: 2018-05-26

## 2018-05-24 RX ORDER — HYDROXYZINE HYDROCHLORIDE 25 MG/1
25 TABLET, FILM COATED ORAL NIGHTLY PRN
Status: DISCONTINUED | OUTPATIENT
Start: 2018-05-24 | End: 2018-05-30 | Stop reason: HOSPADM

## 2018-05-24 RX ORDER — PREDNISONE 20 MG/1
20 TABLET ORAL
Status: DISCONTINUED | OUTPATIENT
Start: 2018-05-30 | End: 2018-05-26

## 2018-05-24 RX ADMIN — ROFLUMILAST 500 MCG: 500 TABLET ORAL at 08:21

## 2018-05-24 RX ADMIN — HYDROXYZINE HYDROCHLORIDE 25 MG: 25 TABLET, FILM COATED ORAL at 20:14

## 2018-05-24 RX ADMIN — IPRATROPIUM BROMIDE AND ALBUTEROL SULFATE 3 ML: 2.5; .5 SOLUTION RESPIRATORY (INHALATION) at 22:48

## 2018-05-24 RX ADMIN — ESMOLOL HYDROCHLORIDE 50 MCG/KG/MIN: 10 INJECTION INTRAVENOUS at 20:20

## 2018-05-24 RX ADMIN — DULOXETINE HYDROCHLORIDE 20 MG: 20 CAPSULE, DELAYED RELEASE ORAL at 08:21

## 2018-05-24 RX ADMIN — IPRATROPIUM BROMIDE AND ALBUTEROL SULFATE 3 ML: 2.5; .5 SOLUTION RESPIRATORY (INHALATION) at 12:19

## 2018-05-24 RX ADMIN — LEVOFLOXACIN 750 MG: 750 TABLET, FILM COATED ORAL at 11:05

## 2018-05-24 RX ADMIN — FUROSEMIDE 40 MG: 10 INJECTION, SOLUTION INTRAMUSCULAR; INTRAVENOUS at 11:05

## 2018-05-24 RX ADMIN — IPRATROPIUM BROMIDE AND ALBUTEROL SULFATE 3 ML: 2.5; .5 SOLUTION RESPIRATORY (INHALATION) at 03:12

## 2018-05-24 RX ADMIN — BUDESONIDE AND FORMOTEROL FUMARATE DIHYDRATE 2 PUFF: 160; 4.5 AEROSOL RESPIRATORY (INHALATION) at 07:16

## 2018-05-24 RX ADMIN — IPRATROPIUM BROMIDE AND ALBUTEROL SULFATE 3 ML: 2.5; .5 SOLUTION RESPIRATORY (INHALATION) at 07:16

## 2018-05-24 RX ADMIN — IPRATROPIUM BROMIDE AND ALBUTEROL SULFATE 3 ML: 2.5; .5 SOLUTION RESPIRATORY (INHALATION) at 15:34

## 2018-05-24 RX ADMIN — BUDESONIDE AND FORMOTEROL FUMARATE DIHYDRATE 2 PUFF: 160; 4.5 AEROSOL RESPIRATORY (INHALATION) at 19:23

## 2018-05-24 RX ADMIN — ENOXAPARIN SODIUM 40 MG: 40 INJECTION SUBCUTANEOUS at 14:59

## 2018-05-24 RX ADMIN — METOPROLOL TARTRATE 25 MG: 25 TABLET ORAL at 08:21

## 2018-05-24 RX ADMIN — IPRATROPIUM BROMIDE AND ALBUTEROL SULFATE 3 ML: 2.5; .5 SOLUTION RESPIRATORY (INHALATION) at 19:23

## 2018-05-24 RX ADMIN — METHYLPREDNISOLONE SODIUM SUCCINATE 40 MG: 40 INJECTION, POWDER, FOR SOLUTION INTRAMUSCULAR; INTRAVENOUS at 02:50

## 2018-05-24 RX ADMIN — METOPROLOL TARTRATE 25 MG: 25 TABLET ORAL at 20:02

## 2018-05-25 ENCOUNTER — APPOINTMENT (OUTPATIENT)
Dept: CARDIOLOGY | Facility: HOSPITAL | Age: 73
End: 2018-05-25

## 2018-05-25 ENCOUNTER — APPOINTMENT (OUTPATIENT)
Dept: GENERAL RADIOLOGY | Facility: HOSPITAL | Age: 73
End: 2018-05-25

## 2018-05-25 PROBLEM — I47.1 SVT (SUPRAVENTRICULAR TACHYCARDIA): Status: ACTIVE | Noted: 2018-05-25

## 2018-05-25 LAB
ANION GAP SERPL CALCULATED.3IONS-SCNC: 6 MMOL/L (ref 3–11)
BACTERIA SPEC AEROBE CULT: NORMAL
BACTERIA SPEC AEROBE CULT: NORMAL
BH CV ECHO MEAS - AO MAX PG (FULL): 0.45 MMHG
BH CV ECHO MEAS - AO MAX PG: 5 MMHG
BH CV ECHO MEAS - AO MEAN PG (FULL): 0.62 MMHG
BH CV ECHO MEAS - AO MEAN PG: 2.8 MMHG
BH CV ECHO MEAS - AO ROOT AREA (BSA CORRECTED): 1.9
BH CV ECHO MEAS - AO ROOT AREA: 7.5 CM^2
BH CV ECHO MEAS - AO ROOT DIAM: 3.1 CM
BH CV ECHO MEAS - AO V2 MAX: 114 CM/SEC
BH CV ECHO MEAS - AO V2 MEAN: 74.5 CM/SEC
BH CV ECHO MEAS - AO V2 VTI: 21.5 CM
BH CV ECHO MEAS - ASC AORTA: 3.1 CM
BH CV ECHO MEAS - AVA(I,A): 4.2 CM^2
BH CV ECHO MEAS - AVA(I,D): 4.2 CM^2
BH CV ECHO MEAS - AVA(V,A): 2.8 CM^2
BH CV ECHO MEAS - AVA(V,D): 2.8 CM^2
BH CV ECHO MEAS - BSA(HAYCOCK): 1.7 M^2
BH CV ECHO MEAS - BSA: 1.7 M^2
BH CV ECHO MEAS - BZI_BMI: 22.1 KILOGRAMS/M^2
BH CV ECHO MEAS - BZI_METRIC_HEIGHT: 165.1 CM
BH CV ECHO MEAS - BZI_METRIC_WEIGHT: 60.3 KG
BH CV ECHO MEAS - EDV(CUBED): 77.2 ML
BH CV ECHO MEAS - EDV(TEICH): 81.2 ML
BH CV ECHO MEAS - EF(CUBED): 80.5 %
BH CV ECHO MEAS - EF(TEICH): 73.4 %
BH CV ECHO MEAS - ESV(CUBED): 15 ML
BH CV ECHO MEAS - ESV(TEICH): 21.6 ML
BH CV ECHO MEAS - FS: 42.1 %
BH CV ECHO MEAS - IVS/LVPW: 1
BH CV ECHO MEAS - IVSD: 0.8 CM
BH CV ECHO MEAS - LA DIMENSION: 3.1 CM
BH CV ECHO MEAS - LA/AO: 1
BH CV ECHO MEAS - LV MASS(C)D: 103.9 GRAMS
BH CV ECHO MEAS - LV MASS(C)DI: 62.4 GRAMS/M^2
BH CV ECHO MEAS - LV MAX PG: 4.5 MMHG
BH CV ECHO MEAS - LV MEAN PG: 2.2 MMHG
BH CV ECHO MEAS - LV V1 MAX: 106.6 CM/SEC
BH CV ECHO MEAS - LV V1 MEAN: 65.4 CM/SEC
BH CV ECHO MEAS - LV V1 VTI: 30.1 CM
BH CV ECHO MEAS - LVIDD: 4.3 CM
BH CV ECHO MEAS - LVIDS: 2.5 CM
BH CV ECHO MEAS - LVOT AREA (M): 2.8 CM^2
BH CV ECHO MEAS - LVOT AREA: 3 CM^2
BH CV ECHO MEAS - LVOT DIAM: 1.9 CM
BH CV ECHO MEAS - LVPWD: 0.8 CM
BH CV ECHO MEAS - MV A MAX VEL: 80.9 CM/SEC
BH CV ECHO MEAS - MV DEC TIME: 0.16 SEC
BH CV ECHO MEAS - MV E MAX VEL: 79 CM/SEC
BH CV ECHO MEAS - MV E/A: 0.98
BH CV ECHO MEAS - PA ACC SLOPE: 810.2 CM/SEC^2
BH CV ECHO MEAS - PA ACC TIME: 0.14 SEC
BH CV ECHO MEAS - PA MAX PG: 5.9 MMHG
BH CV ECHO MEAS - PA PR(ACCEL): 16.4 MMHG
BH CV ECHO MEAS - PA V2 MAX: 121.1 CM/SEC
BH CV ECHO MEAS - RVDD: 1.8 CM
BH CV ECHO MEAS - SI(AO): 97.4 ML/M^2
BH CV ECHO MEAS - SI(CUBED): 37.4 ML/M^2
BH CV ECHO MEAS - SI(LVOT): 53.7 ML/M^2
BH CV ECHO MEAS - SI(TEICH): 35.8 ML/M^2
BH CV ECHO MEAS - SV(AO): 161.9 ML
BH CV ECHO MEAS - SV(CUBED): 62.2 ML
BH CV ECHO MEAS - SV(LVOT): 89.4 ML
BH CV ECHO MEAS - SV(TEICH): 59.6 ML
BH CV ECHO MEAS - TAPSE (>1.6): 1.6 CM2
BH CV ECHO MEAS - TV MAX PG: 1.5 MMHG
BH CV ECHO MEAS - TV V2 MAX: 60.7 CM/SEC
BH CV VAS BP RIGHT ARM: NORMAL MMHG
BH CV XLRA - RV BASE: 2 CM
BH CV XLRA - RV LENGTH: 4.8 CM
BH CV XLRA - RV MID: 1.9 CM
BUN BLD-MCNC: 24 MG/DL (ref 9–23)
BUN/CREAT SERPL: 40 (ref 7–25)
CALCIUM SPEC-SCNC: 8.6 MG/DL (ref 8.7–10.4)
CHLORIDE SERPL-SCNC: 80 MMOL/L (ref 99–109)
CO2 SERPL-SCNC: 48 MMOL/L (ref 20–31)
CREAT BLD-MCNC: 0.6 MG/DL (ref 0.6–1.3)
GFR SERPL CREATININE-BSD FRML MDRD: 98 ML/MIN/1.73
GLUCOSE BLD-MCNC: 99 MG/DL (ref 70–100)
MAXIMAL PREDICTED HEART RATE: 147 BPM
POTASSIUM BLD-SCNC: 3.5 MMOL/L (ref 3.5–5.5)
SODIUM BLD-SCNC: 134 MMOL/L (ref 132–146)
STRESS TARGET HR: 125 BPM
TSH SERPL DL<=0.05 MIU/L-ACNC: 0.34 MIU/ML (ref 0.35–5.35)

## 2018-05-25 PROCEDURE — 93306 TTE W/DOPPLER COMPLETE: CPT | Performed by: INTERNAL MEDICINE

## 2018-05-25 PROCEDURE — 93306 TTE W/DOPPLER COMPLETE: CPT

## 2018-05-25 PROCEDURE — 84443 ASSAY THYROID STIM HORMONE: CPT | Performed by: INTERNAL MEDICINE

## 2018-05-25 PROCEDURE — 94799 UNLISTED PULMONARY SVC/PX: CPT

## 2018-05-25 PROCEDURE — 99233 SBSQ HOSP IP/OBS HIGH 50: CPT | Performed by: INTERNAL MEDICINE

## 2018-05-25 PROCEDURE — 99223 1ST HOSP IP/OBS HIGH 75: CPT | Performed by: INTERNAL MEDICINE

## 2018-05-25 PROCEDURE — 94640 AIRWAY INHALATION TREATMENT: CPT

## 2018-05-25 PROCEDURE — 94760 N-INVAS EAR/PLS OXIMETRY 1: CPT

## 2018-05-25 PROCEDURE — 94644 CONT INHLJ TX 1ST HOUR: CPT

## 2018-05-25 PROCEDURE — 63710000001 PREDNISONE PER 1 MG: Performed by: INTERNAL MEDICINE

## 2018-05-25 PROCEDURE — 84439 ASSAY OF FREE THYROXINE: CPT | Performed by: INTERNAL MEDICINE

## 2018-05-25 PROCEDURE — 71045 X-RAY EXAM CHEST 1 VIEW: CPT

## 2018-05-25 PROCEDURE — 80048 BASIC METABOLIC PNL TOTAL CA: CPT | Performed by: INTERNAL MEDICINE

## 2018-05-25 RX ORDER — DILTIAZEM HYDROCHLORIDE 120 MG/1
120 CAPSULE, COATED, EXTENDED RELEASE ORAL
Status: DISCONTINUED | OUTPATIENT
Start: 2018-05-26 | End: 2018-05-26

## 2018-05-25 RX ADMIN — PREDNISONE 40 MG: 20 TABLET ORAL at 09:28

## 2018-05-25 RX ADMIN — ACETAMINOPHEN 650 MG: 325 TABLET ORAL at 03:39

## 2018-05-25 RX ADMIN — METOPROLOL TARTRATE 25 MG: 25 TABLET ORAL at 20:33

## 2018-05-25 RX ADMIN — IPRATROPIUM BROMIDE AND ALBUTEROL SULFATE 3 ML: 2.5; .5 SOLUTION RESPIRATORY (INHALATION) at 12:04

## 2018-05-25 RX ADMIN — METOPROLOL TARTRATE 25 MG: 25 TABLET ORAL at 09:29

## 2018-05-25 RX ADMIN — ACETAMINOPHEN 650 MG: 325 TABLET ORAL at 16:08

## 2018-05-25 RX ADMIN — DULOXETINE HYDROCHLORIDE 20 MG: 20 CAPSULE, DELAYED RELEASE ORAL at 09:29

## 2018-05-25 RX ADMIN — IPRATROPIUM BROMIDE AND ALBUTEROL SULFATE 3 ML: 2.5; .5 SOLUTION RESPIRATORY (INHALATION) at 03:46

## 2018-05-25 RX ADMIN — BUDESONIDE AND FORMOTEROL FUMARATE DIHYDRATE 2 PUFF: 160; 4.5 AEROSOL RESPIRATORY (INHALATION) at 19:32

## 2018-05-25 RX ADMIN — HYDROXYZINE HYDROCHLORIDE 25 MG: 25 TABLET, FILM COATED ORAL at 20:40

## 2018-05-25 RX ADMIN — LEVOFLOXACIN 750 MG: 750 TABLET, FILM COATED ORAL at 12:20

## 2018-05-25 RX ADMIN — IPRATROPIUM BROMIDE AND ALBUTEROL SULFATE 3 ML: 2.5; .5 SOLUTION RESPIRATORY (INHALATION) at 07:22

## 2018-05-25 RX ADMIN — IPRATROPIUM BROMIDE AND ALBUTEROL SULFATE 3 ML: 2.5; .5 SOLUTION RESPIRATORY (INHALATION) at 15:56

## 2018-05-25 RX ADMIN — BUDESONIDE AND FORMOTEROL FUMARATE DIHYDRATE 2 PUFF: 160; 4.5 AEROSOL RESPIRATORY (INHALATION) at 07:22

## 2018-05-25 RX ADMIN — APIXABAN 5 MG: 5 TABLET, FILM COATED ORAL at 20:33

## 2018-05-25 RX ADMIN — IPRATROPIUM BROMIDE AND ALBUTEROL SULFATE 3 ML: 2.5; .5 SOLUTION RESPIRATORY (INHALATION) at 19:32

## 2018-05-25 RX ADMIN — ROFLUMILAST 500 MCG: 500 TABLET ORAL at 09:29

## 2018-05-26 LAB
MAGNESIUM SERPL-MCNC: 1.7 MG/DL (ref 1.3–2.7)
T4 FREE SERPL-MCNC: 1.45 NG/DL (ref 0.89–1.76)

## 2018-05-26 PROCEDURE — 99233 SBSQ HOSP IP/OBS HIGH 50: CPT | Performed by: INTERNAL MEDICINE

## 2018-05-26 PROCEDURE — 25010000002 DIGOXIN PER 500 MCG: Performed by: INTERNAL MEDICINE

## 2018-05-26 PROCEDURE — 93010 ELECTROCARDIOGRAM REPORT: CPT | Performed by: INTERNAL MEDICINE

## 2018-05-26 PROCEDURE — 93005 ELECTROCARDIOGRAM TRACING: CPT | Performed by: INTERNAL MEDICINE

## 2018-05-26 PROCEDURE — 94760 N-INVAS EAR/PLS OXIMETRY 1: CPT

## 2018-05-26 PROCEDURE — 94640 AIRWAY INHALATION TREATMENT: CPT

## 2018-05-26 PROCEDURE — 83735 ASSAY OF MAGNESIUM: CPT | Performed by: INTERNAL MEDICINE

## 2018-05-26 PROCEDURE — 94799 UNLISTED PULMONARY SVC/PX: CPT

## 2018-05-26 PROCEDURE — 63710000001 PREDNISONE PER 1 MG: Performed by: INTERNAL MEDICINE

## 2018-05-26 RX ORDER — POTASSIUM CHLORIDE 750 MG/1
40 CAPSULE, EXTENDED RELEASE ORAL ONCE
Status: COMPLETED | OUTPATIENT
Start: 2018-05-26 | End: 2018-05-26

## 2018-05-26 RX ORDER — HYDROCODONE BITARTRATE AND ACETAMINOPHEN 5; 325 MG/1; MG/1
1 TABLET ORAL EVERY 4 HOURS PRN
Status: DISCONTINUED | OUTPATIENT
Start: 2018-05-26 | End: 2018-05-30 | Stop reason: HOSPADM

## 2018-05-26 RX ORDER — MAGNESIUM SULFATE HEPTAHYDRATE 40 MG/ML
2 INJECTION, SOLUTION INTRAVENOUS AS NEEDED
Status: DISCONTINUED | OUTPATIENT
Start: 2018-05-26 | End: 2018-05-30 | Stop reason: HOSPADM

## 2018-05-26 RX ORDER — POTASSIUM CHLORIDE 1.5 G/1.77G
40 POWDER, FOR SOLUTION ORAL AS NEEDED
Status: DISCONTINUED | OUTPATIENT
Start: 2018-05-26 | End: 2018-05-30 | Stop reason: HOSPADM

## 2018-05-26 RX ORDER — PREDNISONE 20 MG/1
20 TABLET ORAL
Status: DISCONTINUED | OUTPATIENT
Start: 2018-05-27 | End: 2018-05-29

## 2018-05-26 RX ORDER — DILTIAZEM HYDROCHLORIDE 240 MG/1
240 CAPSULE, COATED, EXTENDED RELEASE ORAL
Status: DISCONTINUED | OUTPATIENT
Start: 2018-05-27 | End: 2018-05-30 | Stop reason: HOSPADM

## 2018-05-26 RX ORDER — POTASSIUM CHLORIDE 7.45 MG/ML
10 INJECTION INTRAVENOUS
Status: DISCONTINUED | OUTPATIENT
Start: 2018-05-26 | End: 2018-05-30 | Stop reason: HOSPADM

## 2018-05-26 RX ORDER — DIGOXIN 0.25 MG/ML
250 INJECTION INTRAMUSCULAR; INTRAVENOUS EVERY 4 HOURS
Status: COMPLETED | OUTPATIENT
Start: 2018-05-26 | End: 2018-05-27

## 2018-05-26 RX ORDER — DILTIAZEM HYDROCHLORIDE 60 MG/1
120 TABLET, FILM COATED ORAL ONCE
Status: COMPLETED | OUTPATIENT
Start: 2018-05-26 | End: 2018-05-26

## 2018-05-26 RX ORDER — MAGNESIUM SULFATE HEPTAHYDRATE 40 MG/ML
4 INJECTION, SOLUTION INTRAVENOUS AS NEEDED
Status: DISCONTINUED | OUTPATIENT
Start: 2018-05-26 | End: 2018-05-30 | Stop reason: HOSPADM

## 2018-05-26 RX ORDER — DILTIAZEM HYDROCHLORIDE 180 MG/1
180 CAPSULE, COATED, EXTENDED RELEASE ORAL
Status: DISCONTINUED | OUTPATIENT
Start: 2018-05-27 | End: 2018-05-26

## 2018-05-26 RX ORDER — POTASSIUM CHLORIDE 750 MG/1
40 CAPSULE, EXTENDED RELEASE ORAL AS NEEDED
Status: DISCONTINUED | OUTPATIENT
Start: 2018-05-26 | End: 2018-05-30 | Stop reason: HOSPADM

## 2018-05-26 RX ADMIN — APIXABAN 5 MG: 5 TABLET, FILM COATED ORAL at 20:24

## 2018-05-26 RX ADMIN — HYDROCODONE BITARTRATE AND ACETAMINOPHEN 1 TABLET: 5; 325 TABLET ORAL at 20:24

## 2018-05-26 RX ADMIN — ACETAMINOPHEN 650 MG: 325 TABLET ORAL at 01:31

## 2018-05-26 RX ADMIN — IPRATROPIUM BROMIDE AND ALBUTEROL SULFATE 3 ML: 2.5; .5 SOLUTION RESPIRATORY (INHALATION) at 07:02

## 2018-05-26 RX ADMIN — LEVOFLOXACIN 750 MG: 750 TABLET, FILM COATED ORAL at 10:16

## 2018-05-26 RX ADMIN — DULOXETINE HYDROCHLORIDE 20 MG: 20 CAPSULE, DELAYED RELEASE ORAL at 08:24

## 2018-05-26 RX ADMIN — POTASSIUM CHLORIDE 40 MEQ: 750 CAPSULE, EXTENDED RELEASE ORAL at 13:16

## 2018-05-26 RX ADMIN — PREDNISONE 40 MG: 20 TABLET ORAL at 08:20

## 2018-05-26 RX ADMIN — ROFLUMILAST 500 MCG: 500 TABLET ORAL at 08:24

## 2018-05-26 RX ADMIN — DILTIAZEM HYDROCHLORIDE 120 MG: 60 TABLET, FILM COATED ORAL at 13:16

## 2018-05-26 RX ADMIN — CELECOXIB 100 MG: 100 CAPSULE ORAL at 10:15

## 2018-05-26 RX ADMIN — HYDROCODONE BITARTRATE AND ACETAMINOPHEN 1 TABLET: 5; 325 TABLET ORAL at 15:46

## 2018-05-26 RX ADMIN — HYDROCODONE BITARTRATE AND ACETAMINOPHEN 1 TABLET: 5; 325 TABLET ORAL at 11:39

## 2018-05-26 RX ADMIN — IPRATROPIUM BROMIDE AND ALBUTEROL SULFATE 3 ML: 2.5; .5 SOLUTION RESPIRATORY (INHALATION) at 23:37

## 2018-05-26 RX ADMIN — IPRATROPIUM BROMIDE AND ALBUTEROL SULFATE 3 ML: 2.5; .5 SOLUTION RESPIRATORY (INHALATION) at 14:57

## 2018-05-26 RX ADMIN — IPRATROPIUM BROMIDE AND ALBUTEROL SULFATE 3 ML: 2.5; .5 SOLUTION RESPIRATORY (INHALATION) at 03:51

## 2018-05-26 RX ADMIN — IPRATROPIUM BROMIDE AND ALBUTEROL SULFATE 3 ML: 2.5; .5 SOLUTION RESPIRATORY (INHALATION) at 19:10

## 2018-05-26 RX ADMIN — ACETAMINOPHEN 650 MG: 325 TABLET ORAL at 08:24

## 2018-05-26 RX ADMIN — BUDESONIDE AND FORMOTEROL FUMARATE DIHYDRATE 2 PUFF: 160; 4.5 AEROSOL RESPIRATORY (INHALATION) at 19:10

## 2018-05-26 RX ADMIN — DIGOXIN 250 MCG: 0.25 INJECTION INTRAMUSCULAR; INTRAVENOUS at 17:50

## 2018-05-26 RX ADMIN — IPRATROPIUM BROMIDE AND ALBUTEROL SULFATE 3 ML: 2.5; .5 SOLUTION RESPIRATORY (INHALATION) at 11:05

## 2018-05-26 RX ADMIN — APIXABAN 5 MG: 5 TABLET, FILM COATED ORAL at 08:20

## 2018-05-26 RX ADMIN — DILTIAZEM HYDROCHLORIDE 120 MG: 120 CAPSULE, COATED, EXTENDED RELEASE ORAL at 08:20

## 2018-05-26 RX ADMIN — DIGOXIN 250 MCG: 0.25 INJECTION INTRAMUSCULAR; INTRAVENOUS at 13:37

## 2018-05-26 RX ADMIN — MAGNESIUM SULFATE HEPTAHYDRATE 4 G: 40 INJECTION, SOLUTION INTRAVENOUS at 16:28

## 2018-05-26 RX ADMIN — DIGOXIN 250 MCG: 0.25 INJECTION INTRAMUSCULAR; INTRAVENOUS at 23:24

## 2018-05-26 RX ADMIN — BUDESONIDE AND FORMOTEROL FUMARATE DIHYDRATE 2 PUFF: 160; 4.5 AEROSOL RESPIRATORY (INHALATION) at 07:02

## 2018-05-27 LAB
MAGNESIUM SERPL-MCNC: 2.2 MG/DL (ref 1.3–2.7)
POTASSIUM BLD-SCNC: 4 MMOL/L (ref 3.5–5.5)

## 2018-05-27 PROCEDURE — 94640 AIRWAY INHALATION TREATMENT: CPT

## 2018-05-27 PROCEDURE — 25010000002 DIGOXIN PER 500 MCG: Performed by: INTERNAL MEDICINE

## 2018-05-27 PROCEDURE — 84132 ASSAY OF SERUM POTASSIUM: CPT | Performed by: INTERNAL MEDICINE

## 2018-05-27 PROCEDURE — 94799 UNLISTED PULMONARY SVC/PX: CPT

## 2018-05-27 PROCEDURE — 97530 THERAPEUTIC ACTIVITIES: CPT | Performed by: OCCUPATIONAL THERAPIST

## 2018-05-27 PROCEDURE — 63710000001 PREDNISONE PER 1 MG: Performed by: INTERNAL MEDICINE

## 2018-05-27 PROCEDURE — 83735 ASSAY OF MAGNESIUM: CPT | Performed by: INTERNAL MEDICINE

## 2018-05-27 PROCEDURE — 99232 SBSQ HOSP IP/OBS MODERATE 35: CPT | Performed by: INTERNAL MEDICINE

## 2018-05-27 RX ORDER — FLECAINIDE ACETATE 50 MG/1
50 TABLET ORAL EVERY 12 HOURS SCHEDULED
Status: DISCONTINUED | OUTPATIENT
Start: 2018-05-27 | End: 2018-05-30 | Stop reason: HOSPADM

## 2018-05-27 RX ORDER — CYCLOBENZAPRINE HCL 10 MG
5 TABLET ORAL 3 TIMES DAILY PRN
Status: DISCONTINUED | OUTPATIENT
Start: 2018-05-27 | End: 2018-05-30 | Stop reason: HOSPADM

## 2018-05-27 RX ADMIN — APIXABAN 5 MG: 5 TABLET, FILM COATED ORAL at 20:49

## 2018-05-27 RX ADMIN — HYDROCODONE BITARTRATE AND ACETAMINOPHEN 1 TABLET: 5; 325 TABLET ORAL at 20:49

## 2018-05-27 RX ADMIN — FLECAINIDE ACETATE 50 MG: 50 TABLET ORAL at 20:49

## 2018-05-27 RX ADMIN — BUDESONIDE AND FORMOTEROL FUMARATE DIHYDRATE 2 PUFF: 160; 4.5 AEROSOL RESPIRATORY (INHALATION) at 18:47

## 2018-05-27 RX ADMIN — DIGOXIN 250 MCG: 0.25 INJECTION INTRAMUSCULAR; INTRAVENOUS at 03:52

## 2018-05-27 RX ADMIN — HYDROCODONE BITARTRATE AND ACETAMINOPHEN 1 TABLET: 5; 325 TABLET ORAL at 15:12

## 2018-05-27 RX ADMIN — IPRATROPIUM BROMIDE AND ALBUTEROL SULFATE 3 ML: 2.5; .5 SOLUTION RESPIRATORY (INHALATION) at 06:16

## 2018-05-27 RX ADMIN — IPRATROPIUM BROMIDE AND ALBUTEROL SULFATE 3 ML: 2.5; .5 SOLUTION RESPIRATORY (INHALATION) at 14:56

## 2018-05-27 RX ADMIN — DULOXETINE HYDROCHLORIDE 20 MG: 20 CAPSULE, DELAYED RELEASE ORAL at 09:05

## 2018-05-27 RX ADMIN — CYCLOBENZAPRINE HYDROCHLORIDE 5 MG: 10 TABLET, FILM COATED ORAL at 22:36

## 2018-05-27 RX ADMIN — FLECAINIDE ACETATE 50 MG: 50 TABLET ORAL at 15:12

## 2018-05-27 RX ADMIN — IPRATROPIUM BROMIDE AND ALBUTEROL SULFATE 3 ML: 2.5; .5 SOLUTION RESPIRATORY (INHALATION) at 10:34

## 2018-05-27 RX ADMIN — HYDROCODONE BITARTRATE AND ACETAMINOPHEN 1 TABLET: 5; 325 TABLET ORAL at 01:28

## 2018-05-27 RX ADMIN — APIXABAN 5 MG: 5 TABLET, FILM COATED ORAL at 09:01

## 2018-05-27 RX ADMIN — IPRATROPIUM BROMIDE AND ALBUTEROL SULFATE 3 ML: 2.5; .5 SOLUTION RESPIRATORY (INHALATION) at 18:47

## 2018-05-27 RX ADMIN — ROFLUMILAST 500 MCG: 500 TABLET ORAL at 09:05

## 2018-05-27 RX ADMIN — HYDROCODONE BITARTRATE AND ACETAMINOPHEN 1 TABLET: 5; 325 TABLET ORAL at 09:01

## 2018-05-27 RX ADMIN — DILTIAZEM HYDROCHLORIDE 240 MG: 240 CAPSULE, COATED, EXTENDED RELEASE ORAL at 09:01

## 2018-05-27 RX ADMIN — BUDESONIDE AND FORMOTEROL FUMARATE DIHYDRATE 2 PUFF: 160; 4.5 AEROSOL RESPIRATORY (INHALATION) at 06:16

## 2018-05-27 RX ADMIN — PREDNISONE 20 MG: 20 TABLET ORAL at 09:01

## 2018-05-28 LAB — MAGNESIUM SERPL-MCNC: 2.2 MG/DL (ref 1.3–2.7)

## 2018-05-28 PROCEDURE — 63710000001 PREDNISONE PER 1 MG: Performed by: INTERNAL MEDICINE

## 2018-05-28 PROCEDURE — 97110 THERAPEUTIC EXERCISES: CPT

## 2018-05-28 PROCEDURE — 93010 ELECTROCARDIOGRAM REPORT: CPT | Performed by: INTERNAL MEDICINE

## 2018-05-28 PROCEDURE — 83735 ASSAY OF MAGNESIUM: CPT

## 2018-05-28 PROCEDURE — 97530 THERAPEUTIC ACTIVITIES: CPT

## 2018-05-28 PROCEDURE — 97116 GAIT TRAINING THERAPY: CPT

## 2018-05-28 PROCEDURE — 94644 CONT INHLJ TX 1ST HOUR: CPT

## 2018-05-28 PROCEDURE — 93005 ELECTROCARDIOGRAM TRACING: CPT | Performed by: INTERNAL MEDICINE

## 2018-05-28 PROCEDURE — 99232 SBSQ HOSP IP/OBS MODERATE 35: CPT | Performed by: INTERNAL MEDICINE

## 2018-05-28 PROCEDURE — 94640 AIRWAY INHALATION TREATMENT: CPT

## 2018-05-28 PROCEDURE — 94799 UNLISTED PULMONARY SVC/PX: CPT

## 2018-05-28 RX ADMIN — IPRATROPIUM BROMIDE AND ALBUTEROL SULFATE 3 ML: 2.5; .5 SOLUTION RESPIRATORY (INHALATION) at 16:09

## 2018-05-28 RX ADMIN — APIXABAN 5 MG: 5 TABLET, FILM COATED ORAL at 20:51

## 2018-05-28 RX ADMIN — DULOXETINE HYDROCHLORIDE 20 MG: 20 CAPSULE, DELAYED RELEASE ORAL at 09:57

## 2018-05-28 RX ADMIN — ROFLUMILAST 500 MCG: 500 TABLET ORAL at 09:57

## 2018-05-28 RX ADMIN — FLECAINIDE ACETATE 50 MG: 50 TABLET ORAL at 20:51

## 2018-05-28 RX ADMIN — IPRATROPIUM BROMIDE AND ALBUTEROL SULFATE 3 ML: 2.5; .5 SOLUTION RESPIRATORY (INHALATION) at 12:43

## 2018-05-28 RX ADMIN — HYDROCODONE BITARTRATE AND ACETAMINOPHEN 1 TABLET: 5; 325 TABLET ORAL at 09:58

## 2018-05-28 RX ADMIN — PREDNISONE 20 MG: 20 TABLET ORAL at 09:58

## 2018-05-28 RX ADMIN — DILTIAZEM HYDROCHLORIDE 240 MG: 240 CAPSULE, COATED, EXTENDED RELEASE ORAL at 09:57

## 2018-05-28 RX ADMIN — FLECAINIDE ACETATE 50 MG: 50 TABLET ORAL at 09:57

## 2018-05-28 RX ADMIN — IPRATROPIUM BROMIDE AND ALBUTEROL SULFATE 3 ML: 2.5; .5 SOLUTION RESPIRATORY (INHALATION) at 18:52

## 2018-05-28 RX ADMIN — IPRATROPIUM BROMIDE AND ALBUTEROL SULFATE 3 ML: 2.5; .5 SOLUTION RESPIRATORY (INHALATION) at 07:18

## 2018-05-28 RX ADMIN — BUDESONIDE AND FORMOTEROL FUMARATE DIHYDRATE 2 PUFF: 160; 4.5 AEROSOL RESPIRATORY (INHALATION) at 18:52

## 2018-05-28 RX ADMIN — BUDESONIDE AND FORMOTEROL FUMARATE DIHYDRATE 2 PUFF: 160; 4.5 AEROSOL RESPIRATORY (INHALATION) at 07:18

## 2018-05-28 RX ADMIN — CYCLOBENZAPRINE HYDROCHLORIDE 5 MG: 10 TABLET, FILM COATED ORAL at 22:14

## 2018-05-28 RX ADMIN — APIXABAN 5 MG: 5 TABLET, FILM COATED ORAL at 09:57

## 2018-05-28 RX ADMIN — HYDROCODONE BITARTRATE AND ACETAMINOPHEN 1 TABLET: 5; 325 TABLET ORAL at 20:51

## 2018-05-28 RX ADMIN — HYDROCODONE BITARTRATE AND ACETAMINOPHEN 1 TABLET: 5; 325 TABLET ORAL at 15:09

## 2018-05-28 RX ADMIN — HYDROCODONE BITARTRATE AND ACETAMINOPHEN 1 TABLET: 5; 325 TABLET ORAL at 06:18

## 2018-05-29 PROCEDURE — 94799 UNLISTED PULMONARY SVC/PX: CPT

## 2018-05-29 PROCEDURE — 99232 SBSQ HOSP IP/OBS MODERATE 35: CPT | Performed by: INTERNAL MEDICINE

## 2018-05-29 PROCEDURE — 93005 ELECTROCARDIOGRAM TRACING: CPT | Performed by: INTERNAL MEDICINE

## 2018-05-29 PROCEDURE — 94640 AIRWAY INHALATION TREATMENT: CPT

## 2018-05-29 PROCEDURE — 97530 THERAPEUTIC ACTIVITIES: CPT

## 2018-05-29 PROCEDURE — 63710000001 PREDNISONE PER 1 MG: Performed by: INTERNAL MEDICINE

## 2018-05-29 PROCEDURE — 97116 GAIT TRAINING THERAPY: CPT

## 2018-05-29 PROCEDURE — 97110 THERAPEUTIC EXERCISES: CPT

## 2018-05-29 PROCEDURE — 93010 ELECTROCARDIOGRAM REPORT: CPT | Performed by: INTERNAL MEDICINE

## 2018-05-29 RX ORDER — SENNA AND DOCUSATE SODIUM 50; 8.6 MG/1; MG/1
2 TABLET, FILM COATED ORAL 2 TIMES DAILY
Status: DISCONTINUED | OUTPATIENT
Start: 2018-05-29 | End: 2018-05-30 | Stop reason: HOSPADM

## 2018-05-29 RX ORDER — PREDNISONE 10 MG/1
10 TABLET ORAL
Status: DISCONTINUED | OUTPATIENT
Start: 2018-05-30 | End: 2018-05-30 | Stop reason: HOSPADM

## 2018-05-29 RX ADMIN — IPRATROPIUM BROMIDE AND ALBUTEROL SULFATE 3 ML: 2.5; .5 SOLUTION RESPIRATORY (INHALATION) at 18:42

## 2018-05-29 RX ADMIN — PREDNISONE 20 MG: 20 TABLET ORAL at 08:13

## 2018-05-29 RX ADMIN — IPRATROPIUM BROMIDE AND ALBUTEROL SULFATE 3 ML: 2.5; .5 SOLUTION RESPIRATORY (INHALATION) at 15:15

## 2018-05-29 RX ADMIN — APIXABAN 5 MG: 5 TABLET, FILM COATED ORAL at 21:31

## 2018-05-29 RX ADMIN — IPRATROPIUM BROMIDE AND ALBUTEROL SULFATE 3 ML: 2.5; .5 SOLUTION RESPIRATORY (INHALATION) at 10:31

## 2018-05-29 RX ADMIN — DULOXETINE HYDROCHLORIDE 20 MG: 20 CAPSULE, DELAYED RELEASE ORAL at 08:13

## 2018-05-29 RX ADMIN — Medication 2 TABLET: at 13:31

## 2018-05-29 RX ADMIN — BUDESONIDE AND FORMOTEROL FUMARATE DIHYDRATE 2 PUFF: 160; 4.5 AEROSOL RESPIRATORY (INHALATION) at 18:42

## 2018-05-29 RX ADMIN — BUDESONIDE AND FORMOTEROL FUMARATE DIHYDRATE 2 PUFF: 160; 4.5 AEROSOL RESPIRATORY (INHALATION) at 06:36

## 2018-05-29 RX ADMIN — FLECAINIDE ACETATE 50 MG: 50 TABLET ORAL at 21:31

## 2018-05-29 RX ADMIN — FLECAINIDE ACETATE 50 MG: 50 TABLET ORAL at 08:13

## 2018-05-29 RX ADMIN — APIXABAN 5 MG: 5 TABLET, FILM COATED ORAL at 08:13

## 2018-05-29 RX ADMIN — IPRATROPIUM BROMIDE AND ALBUTEROL SULFATE 3 ML: 2.5; .5 SOLUTION RESPIRATORY (INHALATION) at 23:58

## 2018-05-29 RX ADMIN — IPRATROPIUM BROMIDE AND ALBUTEROL SULFATE 3 ML: 2.5; .5 SOLUTION RESPIRATORY (INHALATION) at 06:36

## 2018-05-29 RX ADMIN — ROFLUMILAST 500 MCG: 500 TABLET ORAL at 08:13

## 2018-05-29 RX ADMIN — HYDROCODONE BITARTRATE AND ACETAMINOPHEN 1 TABLET: 5; 325 TABLET ORAL at 12:06

## 2018-05-29 RX ADMIN — POLYETHYLENE GLYCOL (3350) 17 G: 17 POWDER, FOR SOLUTION ORAL at 13:31

## 2018-05-29 RX ADMIN — DILTIAZEM HYDROCHLORIDE 240 MG: 240 CAPSULE, COATED, EXTENDED RELEASE ORAL at 08:13

## 2018-05-29 RX ADMIN — HYDROCODONE BITARTRATE AND ACETAMINOPHEN 1 TABLET: 5; 325 TABLET ORAL at 21:31

## 2018-05-29 RX ADMIN — Medication 2 TABLET: at 21:31

## 2018-05-30 VITALS
WEIGHT: 133 LBS | BODY MASS INDEX: 22.16 KG/M2 | RESPIRATION RATE: 16 BRPM | DIASTOLIC BLOOD PRESSURE: 58 MMHG | TEMPERATURE: 98.6 F | HEIGHT: 65 IN | HEART RATE: 80 BPM | SYSTOLIC BLOOD PRESSURE: 121 MMHG | OXYGEN SATURATION: 93 %

## 2018-05-30 PROBLEM — J96.22 ACUTE ON CHRONIC RESPIRATORY FAILURE WITH HYPOXIA AND HYPERCAPNIA (HCC): Status: RESOLVED | Noted: 2018-05-21 | Resolved: 2018-05-30

## 2018-05-30 PROBLEM — I47.1 SVT (SUPRAVENTRICULAR TACHYCARDIA): Status: RESOLVED | Noted: 2018-05-25 | Resolved: 2018-05-30

## 2018-05-30 PROBLEM — J96.21 ACUTE ON CHRONIC RESPIRATORY FAILURE WITH HYPOXIA AND HYPERCAPNIA (HCC): Status: RESOLVED | Noted: 2018-05-21 | Resolved: 2018-05-30

## 2018-05-30 PROCEDURE — 63710000001 PREDNISONE PER 5 MG: Performed by: INTERNAL MEDICINE

## 2018-05-30 PROCEDURE — 94640 AIRWAY INHALATION TREATMENT: CPT

## 2018-05-30 PROCEDURE — 94799 UNLISTED PULMONARY SVC/PX: CPT

## 2018-05-30 PROCEDURE — 94760 N-INVAS EAR/PLS OXIMETRY 1: CPT

## 2018-05-30 PROCEDURE — 99239 HOSP IP/OBS DSCHRG MGMT >30: CPT | Performed by: NURSE PRACTITIONER

## 2018-05-30 RX ORDER — SENNA AND DOCUSATE SODIUM 50; 8.6 MG/1; MG/1
2 TABLET, FILM COATED ORAL 2 TIMES DAILY
Status: ON HOLD
Start: 2018-05-30 | End: 2022-01-01

## 2018-05-30 RX ORDER — CYCLOBENZAPRINE HCL 5 MG
5 TABLET ORAL 3 TIMES DAILY PRN
Status: ON HOLD
Start: 2018-05-30 | End: 2022-01-01

## 2018-05-30 RX ORDER — GUAIFENESIN/DEXTROMETHORPHAN 100-10MG/5
10 SYRUP ORAL EVERY 6 HOURS PRN
Status: ON HOLD
Start: 2018-05-30 | End: 2022-01-01

## 2018-05-30 RX ORDER — HYDROCODONE BITARTRATE AND ACETAMINOPHEN 5; 325 MG/1; MG/1
1 TABLET ORAL EVERY 6 HOURS PRN
Status: ON HOLD
Start: 2018-05-30 | End: 2022-01-01

## 2018-05-30 RX ORDER — ROFLUMILAST 500 UG/1
500 TABLET ORAL DAILY
Status: ON HOLD
Start: 2018-05-31 | End: 2022-01-01

## 2018-05-30 RX ORDER — BISACODYL 10 MG
10 SUPPOSITORY, RECTAL RECTAL DAILY PRN
Status: DISCONTINUED | OUTPATIENT
Start: 2018-05-30 | End: 2018-05-30 | Stop reason: HOSPADM

## 2018-05-30 RX ORDER — HYDROXYZINE HYDROCHLORIDE 25 MG/1
25 TABLET, FILM COATED ORAL NIGHTLY PRN
Status: ON HOLD
Start: 2018-05-30 | End: 2022-01-01

## 2018-05-30 RX ORDER — PREDNISONE 10 MG/1
10 TABLET ORAL
Start: 2018-05-31 | End: 2018-06-02

## 2018-05-30 RX ORDER — BISACODYL 10 MG
10 SUPPOSITORY, RECTAL RECTAL ONCE
Status: COMPLETED | OUTPATIENT
Start: 2018-05-30 | End: 2018-05-30

## 2018-05-30 RX ORDER — ALBUTEROL SULFATE 2.5 MG/3ML
2.5 SOLUTION RESPIRATORY (INHALATION) EVERY 4 HOURS PRN
Refills: 12 | Status: ON HOLD
Start: 2018-05-30 | End: 2022-01-01

## 2018-05-30 RX ORDER — ACETAMINOPHEN 325 MG/1
650 TABLET ORAL EVERY 6 HOURS PRN
Status: ON HOLD
Start: 2018-05-30 | End: 2022-01-01

## 2018-05-30 RX ORDER — FLECAINIDE ACETATE 50 MG/1
50 TABLET ORAL EVERY 12 HOURS SCHEDULED
Start: 2018-05-30

## 2018-05-30 RX ORDER — DILTIAZEM HYDROCHLORIDE 240 MG/1
240 CAPSULE, COATED, EXTENDED RELEASE ORAL
Status: ON HOLD
Start: 2018-05-31 | End: 2022-01-01

## 2018-05-30 RX ADMIN — IPRATROPIUM BROMIDE AND ALBUTEROL SULFATE 3 ML: 2.5; .5 SOLUTION RESPIRATORY (INHALATION) at 03:47

## 2018-05-30 RX ADMIN — BISACODYL 10 MG: 10 SUPPOSITORY RECTAL at 11:34

## 2018-05-30 RX ADMIN — Medication 2 TABLET: at 08:30

## 2018-05-30 RX ADMIN — POLYETHYLENE GLYCOL (3350) 17 G: 17 POWDER, FOR SOLUTION ORAL at 08:30

## 2018-05-30 RX ADMIN — DILTIAZEM HYDROCHLORIDE 240 MG: 240 CAPSULE, COATED, EXTENDED RELEASE ORAL at 08:30

## 2018-05-30 RX ADMIN — BUDESONIDE AND FORMOTEROL FUMARATE DIHYDRATE 2 PUFF: 160; 4.5 AEROSOL RESPIRATORY (INHALATION) at 07:04

## 2018-05-30 RX ADMIN — APIXABAN 5 MG: 5 TABLET, FILM COATED ORAL at 08:30

## 2018-05-30 RX ADMIN — IPRATROPIUM BROMIDE AND ALBUTEROL SULFATE 3 ML: 2.5; .5 SOLUTION RESPIRATORY (INHALATION) at 07:04

## 2018-05-30 RX ADMIN — IPRATROPIUM BROMIDE AND ALBUTEROL SULFATE 3 ML: 2.5; .5 SOLUTION RESPIRATORY (INHALATION) at 15:48

## 2018-05-30 RX ADMIN — DULOXETINE HYDROCHLORIDE 20 MG: 20 CAPSULE, DELAYED RELEASE ORAL at 08:30

## 2018-05-30 RX ADMIN — FLECAINIDE ACETATE 50 MG: 50 TABLET ORAL at 08:30

## 2018-05-30 RX ADMIN — IPRATROPIUM BROMIDE AND ALBUTEROL SULFATE 3 ML: 2.5; .5 SOLUTION RESPIRATORY (INHALATION) at 12:18

## 2018-05-30 RX ADMIN — PREDNISONE 10 MG: 10 TABLET ORAL at 08:30

## 2018-05-30 RX ADMIN — CYCLOBENZAPRINE HYDROCHLORIDE 5 MG: 10 TABLET, FILM COATED ORAL at 00:40

## 2018-05-30 RX ADMIN — ROFLUMILAST 500 MCG: 500 TABLET ORAL at 08:30

## 2022-01-01 ENCOUNTER — APPOINTMENT (OUTPATIENT)
Dept: GENERAL RADIOLOGY | Facility: HOSPITAL | Age: 77
End: 2022-01-01

## 2022-01-01 ENCOUNTER — HOSPITAL ENCOUNTER (INPATIENT)
Facility: HOSPITAL | Age: 77
LOS: 5 days | End: 2022-08-08
Attending: INTERNAL MEDICINE | Admitting: INTERNAL MEDICINE

## 2022-01-01 ENCOUNTER — APPOINTMENT (OUTPATIENT)
Dept: CT IMAGING | Facility: HOSPITAL | Age: 77
End: 2022-01-01

## 2022-01-01 ENCOUNTER — HOSPITAL ENCOUNTER (INPATIENT)
Facility: HOSPITAL | Age: 77
LOS: 10 days | Discharge: HOSPICE | End: 2022-08-03
Attending: INTERNAL MEDICINE | Admitting: INTERNAL MEDICINE

## 2022-01-01 VITALS
DIASTOLIC BLOOD PRESSURE: 67 MMHG | TEMPERATURE: 98.1 F | RESPIRATION RATE: 17 BRPM | OXYGEN SATURATION: 93 % | HEART RATE: 76 BPM | SYSTOLIC BLOOD PRESSURE: 165 MMHG

## 2022-01-01 VITALS
WEIGHT: 126.54 LBS | TEMPERATURE: 98.2 F | HEART RATE: 71 BPM | OXYGEN SATURATION: 93 % | RESPIRATION RATE: 20 BRPM | DIASTOLIC BLOOD PRESSURE: 80 MMHG | BODY MASS INDEX: 20.34 KG/M2 | SYSTOLIC BLOOD PRESSURE: 183 MMHG | HEIGHT: 66 IN

## 2022-01-01 LAB
ALBUMIN SERPL-MCNC: 4.3 G/DL (ref 3.5–5.2)
ALBUMIN/GLOB SERPL: 1.6 G/DL
ALP SERPL-CCNC: 56 U/L (ref 39–117)
ALT SERPL W P-5'-P-CCNC: 11 U/L (ref 1–33)
AMPHET+METHAMPHET UR QL: NEGATIVE
AMPHETAMINES UR QL: NEGATIVE
ANION GAP SERPL CALCULATED.3IONS-SCNC: -1 MMOL/L (ref 5–15)
ANION GAP SERPL CALCULATED.3IONS-SCNC: -2 MMOL/L (ref 5–15)
ANION GAP SERPL CALCULATED.3IONS-SCNC: -2 MMOL/L (ref 5–15)
ANION GAP SERPL CALCULATED.3IONS-SCNC: 1 MMOL/L (ref 5–15)
ANION GAP SERPL CALCULATED.3IONS-SCNC: 2 MMOL/L (ref 5–15)
ANION GAP SERPL CALCULATED.3IONS-SCNC: 3 MMOL/L (ref 5–15)
ANION GAP SERPL CALCULATED.3IONS-SCNC: ABNORMAL MMOL/L
ARTERIAL PATENCY WRIST A: ABNORMAL
ARTERIAL PATENCY WRIST A: POSITIVE
AST SERPL-CCNC: 22 U/L (ref 1–32)
ATMOSPHERIC PRESS: ABNORMAL MM[HG]
B PARAPERT DNA SPEC QL NAA+PROBE: NOT DETECTED
B PERT DNA SPEC QL NAA+PROBE: NOT DETECTED
BACTERIA SPEC RESP CULT: ABNORMAL
BACTERIA SPEC RESP CULT: ABNORMAL
BACTERIA UR QL AUTO: NORMAL /HPF
BARBITURATES UR QL SCN: NEGATIVE
BASE EXCESS BLDA CALC-SCNC: 17.7 MMOL/L (ref 0–2)
BASE EXCESS BLDA CALC-SCNC: 18.8 MMOL/L (ref 0–2)
BASE EXCESS BLDA CALC-SCNC: 19.1 MMOL/L (ref 0–2)
BASE EXCESS BLDA CALC-SCNC: 23.2 MMOL/L (ref 0–2)
BASOPHILS # BLD AUTO: 0.01 10*3/MM3 (ref 0–0.2)
BASOPHILS NFR BLD AUTO: 0.1 % (ref 0–1.5)
BDY SITE: ABNORMAL
BENZODIAZ UR QL SCN: NEGATIVE
BILIRUB SERPL-MCNC: 0.5 MG/DL (ref 0–1.2)
BILIRUB UR QL STRIP: NEGATIVE
BODY TEMPERATURE: 37 C
BUN SERPL-MCNC: 18 MG/DL (ref 8–23)
BUN SERPL-MCNC: 18 MG/DL (ref 8–23)
BUN SERPL-MCNC: 21 MG/DL (ref 8–23)
BUN SERPL-MCNC: 21 MG/DL (ref 8–23)
BUN SERPL-MCNC: 22 MG/DL (ref 8–23)
BUN SERPL-MCNC: 24 MG/DL (ref 8–23)
BUN SERPL-MCNC: 25 MG/DL (ref 8–23)
BUN SERPL-MCNC: 26 MG/DL (ref 8–23)
BUN SERPL-MCNC: 27 MG/DL (ref 8–23)
BUN SERPL-MCNC: 29 MG/DL (ref 8–23)
BUN SERPL-MCNC: 30 MG/DL (ref 8–23)
BUN/CREAT SERPL: 32.8 (ref 7–25)
BUN/CREAT SERPL: 34 (ref 7–25)
BUN/CREAT SERPL: 34.4 (ref 7–25)
BUN/CREAT SERPL: 37.3 (ref 7–25)
BUN/CREAT SERPL: 40.9 (ref 7–25)
BUN/CREAT SERPL: 45 (ref 7–25)
BUN/CREAT SERPL: 48.1 (ref 7–25)
BUN/CREAT SERPL: 48.3 (ref 7–25)
BUN/CREAT SERPL: 49 (ref 7–25)
BUN/CREAT SERPL: 56.8 (ref 7–25)
BUN/CREAT SERPL: 68.2 (ref 7–25)
BUPRENORPHINE SERPL-MCNC: NEGATIVE NG/ML
C PNEUM DNA NPH QL NAA+NON-PROBE: NOT DETECTED
CA-I SERPL ISE-MCNC: 1.22 MMOL/L (ref 1.12–1.32)
CA-I SERPL ISE-MCNC: 1.23 MMOL/L (ref 1.12–1.32)
CALCIUM SPEC-SCNC: 8.5 MG/DL (ref 8.6–10.5)
CALCIUM SPEC-SCNC: 8.6 MG/DL (ref 8.6–10.5)
CALCIUM SPEC-SCNC: 8.7 MG/DL (ref 8.6–10.5)
CALCIUM SPEC-SCNC: 8.8 MG/DL (ref 8.6–10.5)
CALCIUM SPEC-SCNC: 8.8 MG/DL (ref 8.6–10.5)
CALCIUM SPEC-SCNC: 8.9 MG/DL (ref 8.6–10.5)
CALCIUM SPEC-SCNC: 8.9 MG/DL (ref 8.6–10.5)
CALCIUM SPEC-SCNC: 9 MG/DL (ref 8.6–10.5)
CALCIUM SPEC-SCNC: 9 MG/DL (ref 8.6–10.5)
CALCIUM SPEC-SCNC: 9.3 MG/DL (ref 8.6–10.5)
CALCIUM SPEC-SCNC: 9.3 MG/DL (ref 8.6–10.5)
CANNABINOIDS SERPL QL: NEGATIVE
CHLORIDE SERPL-SCNC: 82 MMOL/L (ref 98–107)
CHLORIDE SERPL-SCNC: 82 MMOL/L (ref 98–107)
CHLORIDE SERPL-SCNC: 84 MMOL/L (ref 98–107)
CHLORIDE SERPL-SCNC: 87 MMOL/L (ref 98–107)
CHLORIDE SERPL-SCNC: 89 MMOL/L (ref 98–107)
CHLORIDE SERPL-SCNC: 91 MMOL/L (ref 98–107)
CHLORIDE SERPL-SCNC: 92 MMOL/L (ref 98–107)
CHLORIDE SERPL-SCNC: 93 MMOL/L (ref 98–107)
CHLORIDE SERPL-SCNC: 94 MMOL/L (ref 98–107)
CHLORIDE SERPL-SCNC: 94 MMOL/L (ref 98–107)
CHLORIDE SERPL-SCNC: 95 MMOL/L (ref 98–107)
CLARITY UR: ABNORMAL
CO2 BLDA-SCNC: 48.1 MMOL/L (ref 22–33)
CO2 BLDA-SCNC: 50.8 MMOL/L (ref 22–33)
CO2 BLDA-SCNC: 52.9 MMOL/L (ref 22–33)
CO2 BLDA-SCNC: 54.3 MMOL/L (ref 22–33)
CO2 SERPL-SCNC: 41 MMOL/L (ref 22–29)
CO2 SERPL-SCNC: 41 MMOL/L (ref 22–29)
CO2 SERPL-SCNC: 44 MMOL/L (ref 22–29)
CO2 SERPL-SCNC: 45 MMOL/L (ref 22–29)
CO2 SERPL-SCNC: 46 MMOL/L (ref 22–29)
CO2 SERPL-SCNC: 48 MMOL/L (ref 22–29)
CO2 SERPL-SCNC: 48 MMOL/L (ref 22–29)
CO2 SERPL-SCNC: 50 MMOL/L (ref 22–29)
CO2 SERPL-SCNC: >50 MMOL/L (ref 22–29)
COCAINE UR QL: NEGATIVE
COHGB MFR BLD: 1 % (ref 0–2)
COHGB MFR BLD: 1.1 % (ref 0–2)
COHGB MFR BLD: 1.9 % (ref 0–2)
COHGB MFR BLD: 2.1 % (ref 0–2)
COLOR UR: YELLOW
CREAT SERPL-MCNC: 0.44 MG/DL (ref 0.57–1)
CREAT SERPL-MCNC: 0.49 MG/DL (ref 0.57–1)
CREAT SERPL-MCNC: 0.53 MG/DL (ref 0.57–1)
CREAT SERPL-MCNC: 0.54 MG/DL (ref 0.57–1)
CREAT SERPL-MCNC: 0.59 MG/DL (ref 0.57–1)
CREAT SERPL-MCNC: 0.6 MG/DL (ref 0.57–1)
CREAT SERPL-MCNC: 0.6 MG/DL (ref 0.57–1)
CREAT SERPL-MCNC: 0.61 MG/DL (ref 0.57–1)
CREAT SERPL-MCNC: 0.64 MG/DL (ref 0.57–1)
D-LACTATE SERPL-SCNC: 1.2 MMOL/L (ref 0.5–2)
DEPRECATED RDW RBC AUTO: 44.9 FL (ref 37–54)
DEPRECATED RDW RBC AUTO: 45.1 FL (ref 37–54)
DEPRECATED RDW RBC AUTO: 45.6 FL (ref 37–54)
DEPRECATED RDW RBC AUTO: 46.4 FL (ref 37–54)
DEPRECATED RDW RBC AUTO: 46.7 FL (ref 37–54)
DEPRECATED RDW RBC AUTO: 47.3 FL (ref 37–54)
DEPRECATED RDW RBC AUTO: 47.8 FL (ref 37–54)
DEPRECATED RDW RBC AUTO: 48.6 FL (ref 37–54)
DEPRECATED RDW RBC AUTO: 50 FL (ref 37–54)
DEPRECATED RDW RBC AUTO: 51 FL (ref 37–54)
EGFRCR SERPLBLD CKD-EPI 2021: 91.2 ML/MIN/1.73
EGFRCR SERPLBLD CKD-EPI 2021: 92.2 ML/MIN/1.73
EGFRCR SERPLBLD CKD-EPI 2021: 92.6 ML/MIN/1.73
EGFRCR SERPLBLD CKD-EPI 2021: 92.6 ML/MIN/1.73
EGFRCR SERPLBLD CKD-EPI 2021: 93 ML/MIN/1.73
EGFRCR SERPLBLD CKD-EPI 2021: 95 ML/MIN/1.73
EGFRCR SERPLBLD CKD-EPI 2021: 95.4 ML/MIN/1.73
EGFRCR SERPLBLD CKD-EPI 2021: 97.2 ML/MIN/1.73
EGFRCR SERPLBLD CKD-EPI 2021: 99.8 ML/MIN/1.73
EOSINOPHIL # BLD AUTO: 0.06 10*3/MM3 (ref 0–0.4)
EOSINOPHIL NFR BLD AUTO: 0.4 % (ref 0.3–6.2)
EPAP: 0
EPAP: 8
ERYTHROCYTE [DISTWIDTH] IN BLOOD BY AUTOMATED COUNT: 12 % (ref 12.3–15.4)
ERYTHROCYTE [DISTWIDTH] IN BLOOD BY AUTOMATED COUNT: 12.2 % (ref 12.3–15.4)
ERYTHROCYTE [DISTWIDTH] IN BLOOD BY AUTOMATED COUNT: 12.5 % (ref 12.3–15.4)
ERYTHROCYTE [DISTWIDTH] IN BLOOD BY AUTOMATED COUNT: 12.6 % (ref 12.3–15.4)
ERYTHROCYTE [DISTWIDTH] IN BLOOD BY AUTOMATED COUNT: 12.7 % (ref 12.3–15.4)
ERYTHROCYTE [DISTWIDTH] IN BLOOD BY AUTOMATED COUNT: 12.8 % (ref 12.3–15.4)
ERYTHROCYTE [DISTWIDTH] IN BLOOD BY AUTOMATED COUNT: 13.1 % (ref 12.3–15.4)
ERYTHROCYTE [DISTWIDTH] IN BLOOD BY AUTOMATED COUNT: 13.2 % (ref 12.3–15.4)
ERYTHROCYTE [DISTWIDTH] IN BLOOD BY AUTOMATED COUNT: 13.3 % (ref 12.3–15.4)
ERYTHROCYTE [DISTWIDTH] IN BLOOD BY AUTOMATED COUNT: 13.4 % (ref 12.3–15.4)
FLUAV SUBTYP SPEC NAA+PROBE: NOT DETECTED
FLUBV RNA ISLT QL NAA+PROBE: NOT DETECTED
GLOBULIN UR ELPH-MCNC: 2.7 GM/DL
GLUCOSE BLDC GLUCOMTR-MCNC: 104 MG/DL (ref 70–130)
GLUCOSE BLDC GLUCOMTR-MCNC: 109 MG/DL (ref 70–130)
GLUCOSE SERPL-MCNC: 106 MG/DL (ref 65–99)
GLUCOSE SERPL-MCNC: 109 MG/DL (ref 65–99)
GLUCOSE SERPL-MCNC: 111 MG/DL (ref 65–99)
GLUCOSE SERPL-MCNC: 112 MG/DL (ref 65–99)
GLUCOSE SERPL-MCNC: 118 MG/DL (ref 65–99)
GLUCOSE SERPL-MCNC: 126 MG/DL (ref 65–99)
GLUCOSE SERPL-MCNC: 71 MG/DL (ref 65–99)
GLUCOSE SERPL-MCNC: 82 MG/DL (ref 65–99)
GLUCOSE SERPL-MCNC: 87 MG/DL (ref 65–99)
GLUCOSE SERPL-MCNC: 90 MG/DL (ref 65–99)
GLUCOSE SERPL-MCNC: 91 MG/DL (ref 65–99)
GLUCOSE UR STRIP-MCNC: NEGATIVE MG/DL
GRAM STN SPEC: ABNORMAL
HADV DNA SPEC NAA+PROBE: NOT DETECTED
HCO3 BLDA-SCNC: 46 MMOL/L (ref 20–26)
HCO3 BLDA-SCNC: 47.9 MMOL/L (ref 20–26)
HCO3 BLDA-SCNC: 49.2 MMOL/L (ref 20–26)
HCO3 BLDA-SCNC: 51.5 MMOL/L (ref 20–26)
HCOV 229E RNA SPEC QL NAA+PROBE: NOT DETECTED
HCOV HKU1 RNA SPEC QL NAA+PROBE: NOT DETECTED
HCOV NL63 RNA SPEC QL NAA+PROBE: NOT DETECTED
HCOV OC43 RNA SPEC QL NAA+PROBE: NOT DETECTED
HCT VFR BLD AUTO: 25.5 % (ref 34–46.6)
HCT VFR BLD AUTO: 25.8 % (ref 34–46.6)
HCT VFR BLD AUTO: 26.3 % (ref 34–46.6)
HCT VFR BLD AUTO: 26.8 % (ref 34–46.6)
HCT VFR BLD AUTO: 27.2 % (ref 34–46.6)
HCT VFR BLD AUTO: 27.6 % (ref 34–46.6)
HCT VFR BLD AUTO: 28.4 % (ref 34–46.6)
HCT VFR BLD AUTO: 28.5 % (ref 34–46.6)
HCT VFR BLD AUTO: 29 % (ref 34–46.6)
HCT VFR BLD AUTO: 34 % (ref 34–46.6)
HCT VFR BLD CALC: 25 % (ref 38–51)
HCT VFR BLD CALC: 28.1 % (ref 38–51)
HCT VFR BLD CALC: 28.4 % (ref 38–51)
HCT VFR BLD CALC: 29.8 % (ref 38–51)
HGB BLD-MCNC: 10.9 G/DL (ref 12–15.9)
HGB BLD-MCNC: 8.2 G/DL (ref 12–15.9)
HGB BLD-MCNC: 8.5 G/DL (ref 12–15.9)
HGB BLD-MCNC: 8.6 G/DL (ref 12–15.9)
HGB BLD-MCNC: 8.6 G/DL (ref 12–15.9)
HGB BLD-MCNC: 8.8 G/DL (ref 12–15.9)
HGB BLD-MCNC: 8.8 G/DL (ref 12–15.9)
HGB BLD-MCNC: 8.9 G/DL (ref 12–15.9)
HGB BLD-MCNC: 9.1 G/DL (ref 12–15.9)
HGB BLD-MCNC: 9.2 G/DL (ref 12–15.9)
HGB BLDA-MCNC: 8.2 G/DL (ref 14–18)
HGB BLDA-MCNC: 9.2 G/DL (ref 14–18)
HGB BLDA-MCNC: 9.3 G/DL (ref 14–18)
HGB BLDA-MCNC: 9.7 G/DL (ref 14–18)
HGB UR QL STRIP.AUTO: NEGATIVE
HMPV RNA NPH QL NAA+NON-PROBE: NOT DETECTED
HPIV1 RNA ISLT QL NAA+PROBE: NOT DETECTED
HPIV2 RNA SPEC QL NAA+PROBE: NOT DETECTED
HPIV3 RNA NPH QL NAA+PROBE: NOT DETECTED
HPIV4 P GENE NPH QL NAA+PROBE: NOT DETECTED
HYALINE CASTS UR QL AUTO: NORMAL /LPF
IMM GRANULOCYTES # BLD AUTO: 0.08 10*3/MM3 (ref 0–0.05)
IMM GRANULOCYTES NFR BLD AUTO: 0.6 % (ref 0–0.5)
INHALED O2 CONCENTRATION: 40 %
INHALED O2 CONCENTRATION: 60 %
INR PPP: 1.06 (ref 0.84–1.13)
INR PPP: 1.13 (ref 0.84–1.13)
INR PPP: 1.13 (ref 0.84–1.13)
INR PPP: 1.16 (ref 0.84–1.13)
INR PPP: 1.2 (ref 0.84–1.13)
INR PPP: 1.23 (ref 0.84–1.13)
INR PPP: 1.23 (ref 0.84–1.13)
INR PPP: 1.24 (ref 0.84–1.13)
IPAP: 0
IPAP: 18
KETONES UR QL STRIP: ABNORMAL
LEUKOCYTE ESTERASE UR QL STRIP.AUTO: NEGATIVE
LYMPHOCYTES # BLD AUTO: 0.71 10*3/MM3 (ref 0.7–3.1)
LYMPHOCYTES NFR BLD AUTO: 5.2 % (ref 19.6–45.3)
Lab: ABNORMAL
M PNEUMO IGG SER IA-ACNC: NOT DETECTED
MAGNESIUM SERPL-MCNC: 1.8 MG/DL (ref 1.6–2.4)
MAGNESIUM SERPL-MCNC: 2.1 MG/DL (ref 1.6–2.4)
MAGNESIUM SERPL-MCNC: 2.2 MG/DL (ref 1.6–2.4)
MCH RBC QN AUTO: 31.8 PG (ref 26.6–33)
MCH RBC QN AUTO: 32.1 PG (ref 26.6–33)
MCH RBC QN AUTO: 32.1 PG (ref 26.6–33)
MCH RBC QN AUTO: 32.2 PG (ref 26.6–33)
MCH RBC QN AUTO: 32.3 PG (ref 26.6–33)
MCH RBC QN AUTO: 32.4 PG (ref 26.6–33)
MCH RBC QN AUTO: 32.4 PG (ref 26.6–33)
MCH RBC QN AUTO: 33.1 PG (ref 26.6–33)
MCHC RBC AUTO-ENTMCNC: 31.2 G/DL (ref 31.5–35.7)
MCHC RBC AUTO-ENTMCNC: 31.7 G/DL (ref 31.5–35.7)
MCHC RBC AUTO-ENTMCNC: 31.8 G/DL (ref 31.5–35.7)
MCHC RBC AUTO-ENTMCNC: 31.9 G/DL (ref 31.5–35.7)
MCHC RBC AUTO-ENTMCNC: 32 G/DL (ref 31.5–35.7)
MCHC RBC AUTO-ENTMCNC: 32.1 G/DL (ref 31.5–35.7)
MCHC RBC AUTO-ENTMCNC: 32.1 G/DL (ref 31.5–35.7)
MCHC RBC AUTO-ENTMCNC: 32.4 G/DL (ref 31.5–35.7)
MCHC RBC AUTO-ENTMCNC: 32.7 G/DL (ref 31.5–35.7)
MCHC RBC AUTO-ENTMCNC: 33.3 G/DL (ref 31.5–35.7)
MCV RBC AUTO: 100 FL (ref 79–97)
MCV RBC AUTO: 100.3 FL (ref 79–97)
MCV RBC AUTO: 100.4 FL (ref 79–97)
MCV RBC AUTO: 100.7 FL (ref 79–97)
MCV RBC AUTO: 100.7 FL (ref 79–97)
MCV RBC AUTO: 101.2 FL (ref 79–97)
MCV RBC AUTO: 103.3 FL (ref 79–97)
MCV RBC AUTO: 103.3 FL (ref 79–97)
MCV RBC AUTO: 97.3 FL (ref 79–97)
MCV RBC AUTO: 98.1 FL (ref 79–97)
METHADONE UR QL SCN: NEGATIVE
METHGB BLD QL: 0.2 % (ref 0–1.5)
METHGB BLD QL: 0.2 % (ref 0–1.5)
METHGB BLD QL: 0.4 % (ref 0–1.5)
METHGB BLD QL: 0.6 % (ref 0–1.5)
MODALITY: ABNORMAL
MONOCYTES # BLD AUTO: 1.52 10*3/MM3 (ref 0.1–0.9)
MONOCYTES NFR BLD AUTO: 11.1 % (ref 5–12)
MRSA DNA SPEC QL NAA+PROBE: NEGATIVE
NEUTROPHILS NFR BLD AUTO: 11.35 10*3/MM3 (ref 1.7–7)
NEUTROPHILS NFR BLD AUTO: 82.6 % (ref 42.7–76)
NITRITE UR QL STRIP: NEGATIVE
NOTE: ABNORMAL
NOTIFIED BY: ABNORMAL
NOTIFIED WHO: ABNORMAL
NRBC BLD AUTO-RTO: 0 /100 WBC (ref 0–0.2)
NT-PROBNP SERPL-MCNC: 922.4 PG/ML (ref 0–1800)
OPIATES UR QL: NEGATIVE
OXYCODONE UR QL SCN: NEGATIVE
OXYHGB MFR BLDV: 88.1 % (ref 94–99)
OXYHGB MFR BLDV: 95.3 % (ref 94–99)
OXYHGB MFR BLDV: 95.3 % (ref 94–99)
OXYHGB MFR BLDV: 97.4 % (ref 94–99)
PAW @ PEAK INSP FLOW SETTING VENT: 0 CMH2O
PCO2 BLDA: 69 MM HG (ref 35–45)
PCO2 BLDA: 88.8 MM HG (ref 35–45)
PCO2 BLDA: 91.9 MM HG (ref 35–45)
PCO2 BLDA: ABNORMAL MM[HG]
PCO2 TEMP ADJ BLD: 69 MM HG (ref 35–45)
PCO2 TEMP ADJ BLD: 88.8 MM HG (ref 35–45)
PCO2 TEMP ADJ BLD: 91.9 MM HG (ref 35–45)
PCO2 TEMP ADJ BLD: ABNORMAL MM[HG]
PCP UR QL SCN: NEGATIVE
PH BLDA: 7.22 PH UNITS (ref 7.35–7.45)
PH BLDA: 7.33 PH UNITS (ref 7.35–7.45)
PH BLDA: 7.37 PH UNITS (ref 7.35–7.45)
PH BLDA: 7.43 PH UNITS (ref 7.35–7.45)
PH UR STRIP.AUTO: 5.5 [PH] (ref 5–8)
PH, TEMP CORRECTED: 7.22 PH UNITS
PH, TEMP CORRECTED: 7.33 PH UNITS
PH, TEMP CORRECTED: 7.37 PH UNITS
PH, TEMP CORRECTED: 7.43 PH UNITS
PHOSPHATE SERPL-MCNC: 3.4 MG/DL (ref 2.5–4.5)
PHOSPHATE SERPL-MCNC: 4.1 MG/DL (ref 2.5–4.5)
PHOSPHATE SERPL-MCNC: 4.5 MG/DL (ref 2.5–4.5)
PLATELET # BLD AUTO: 137 10*3/MM3 (ref 140–450)
PLATELET # BLD AUTO: 140 10*3/MM3 (ref 140–450)
PLATELET # BLD AUTO: 151 10*3/MM3 (ref 140–450)
PLATELET # BLD AUTO: 167 10*3/MM3 (ref 140–450)
PLATELET # BLD AUTO: 184 10*3/MM3 (ref 140–450)
PLATELET # BLD AUTO: 186 10*3/MM3 (ref 140–450)
PLATELET # BLD AUTO: 197 10*3/MM3 (ref 140–450)
PLATELET # BLD AUTO: 208 10*3/MM3 (ref 140–450)
PLATELET # BLD AUTO: 225 10*3/MM3 (ref 140–450)
PLATELET # BLD AUTO: 229 10*3/MM3 (ref 140–450)
PMV BLD AUTO: 10 FL (ref 6–12)
PMV BLD AUTO: 10.4 FL (ref 6–12)
PMV BLD AUTO: 10.5 FL (ref 6–12)
PMV BLD AUTO: 10.7 FL (ref 6–12)
PMV BLD AUTO: 10.7 FL (ref 6–12)
PMV BLD AUTO: 10.8 FL (ref 6–12)
PMV BLD AUTO: 10.9 FL (ref 6–12)
PMV BLD AUTO: 11.3 FL (ref 6–12)
PMV BLD AUTO: 9.7 FL (ref 6–12)
PMV BLD AUTO: 9.7 FL (ref 6–12)
PO2 BLDA: 101 MM HG (ref 83–108)
PO2 BLDA: 59.6 MM HG (ref 83–108)
PO2 BLDA: 82.2 MM HG (ref 83–108)
PO2 BLDA: 97.7 MM HG (ref 83–108)
PO2 TEMP ADJ BLD: 101 MM HG (ref 83–108)
PO2 TEMP ADJ BLD: 59.6 MM HG (ref 83–108)
PO2 TEMP ADJ BLD: 82.2 MM HG (ref 83–108)
PO2 TEMP ADJ BLD: 97.7 MM HG (ref 83–108)
POTASSIUM SERPL-SCNC: 3.8 MMOL/L (ref 3.5–5.2)
POTASSIUM SERPL-SCNC: 3.9 MMOL/L (ref 3.5–5.2)
POTASSIUM SERPL-SCNC: 3.9 MMOL/L (ref 3.5–5.2)
POTASSIUM SERPL-SCNC: 4 MMOL/L (ref 3.5–5.2)
POTASSIUM SERPL-SCNC: 4.1 MMOL/L (ref 3.5–5.2)
POTASSIUM SERPL-SCNC: 4.4 MMOL/L (ref 3.5–5.2)
POTASSIUM SERPL-SCNC: 4.6 MMOL/L (ref 3.5–5.2)
POTASSIUM SERPL-SCNC: 4.6 MMOL/L (ref 3.5–5.2)
POTASSIUM SERPL-SCNC: 4.7 MMOL/L (ref 3.5–5.2)
POTASSIUM SERPL-SCNC: 5 MMOL/L (ref 3.5–5.2)
POTASSIUM SERPL-SCNC: 5.1 MMOL/L (ref 3.5–5.2)
PROCALCITONIN SERPL-MCNC: 0.06 NG/ML (ref 0–0.25)
PROCALCITONIN SERPL-MCNC: 0.08 NG/ML (ref 0–0.25)
PROPOXYPH UR QL: NEGATIVE
PROT SERPL-MCNC: 7 G/DL (ref 6–8.5)
PROT UR QL STRIP: ABNORMAL
PROTHROMBIN TIME: 13.7 SECONDS (ref 11.4–14.4)
PROTHROMBIN TIME: 14.4 SECONDS (ref 11.4–14.4)
PROTHROMBIN TIME: 14.4 SECONDS (ref 11.4–14.4)
PROTHROMBIN TIME: 14.8 SECONDS (ref 11.4–14.4)
PROTHROMBIN TIME: 15.1 SECONDS (ref 11.4–14.4)
PROTHROMBIN TIME: 15.4 SECONDS (ref 11.4–14.4)
PROTHROMBIN TIME: 15.4 SECONDS (ref 11.4–14.4)
PROTHROMBIN TIME: 15.6 SECONDS (ref 11.4–14.4)
QT INTERVAL: 404 MS
QT INTERVAL: 404 MS
QTC INTERVAL: 399 MS
QTC INTERVAL: 423 MS
RBC # BLD AUTO: 2.55 10*6/MM3 (ref 3.77–5.28)
RBC # BLD AUTO: 2.62 10*6/MM3 (ref 3.77–5.28)
RBC # BLD AUTO: 2.67 10*6/MM3 (ref 3.77–5.28)
RBC # BLD AUTO: 2.68 10*6/MM3 (ref 3.77–5.28)
RBC # BLD AUTO: 2.72 10*6/MM3 (ref 3.77–5.28)
RBC # BLD AUTO: 2.74 10*6/MM3 (ref 3.77–5.28)
RBC # BLD AUTO: 2.76 10*6/MM3 (ref 3.77–5.28)
RBC # BLD AUTO: 2.82 10*6/MM3 (ref 3.77–5.28)
RBC # BLD AUTO: 2.89 10*6/MM3 (ref 3.77–5.28)
RBC # BLD AUTO: 3.29 10*6/MM3 (ref 3.77–5.28)
RBC # UR STRIP: NORMAL /HPF
REF LAB TEST METHOD: NORMAL
RHINOVIRUS RNA SPEC NAA+PROBE: NOT DETECTED
RSV RNA NPH QL NAA+NON-PROBE: NOT DETECTED
SARS-COV-2 RNA NPH QL NAA+NON-PROBE: NOT DETECTED
SET MECH RESP RATE: 16
SODIUM SERPL-SCNC: 127 MMOL/L (ref 136–145)
SODIUM SERPL-SCNC: 129 MMOL/L (ref 136–145)
SODIUM SERPL-SCNC: 132 MMOL/L (ref 136–145)
SODIUM SERPL-SCNC: 136 MMOL/L (ref 136–145)
SODIUM SERPL-SCNC: 137 MMOL/L (ref 136–145)
SODIUM SERPL-SCNC: 138 MMOL/L (ref 136–145)
SODIUM SERPL-SCNC: 139 MMOL/L (ref 136–145)
SODIUM SERPL-SCNC: 140 MMOL/L (ref 136–145)
SP GR UR STRIP: 1.02 (ref 1–1.03)
SQUAMOUS #/AREA URNS HPF: NORMAL /HPF
T4 FREE SERPL-MCNC: 1.4 NG/DL (ref 0.93–1.7)
TOTAL RATE: 0 BREATHS/MINUTE
TOTAL RATE: 20 BREATHS/MINUTE
TRICYCLICS UR QL SCN: NEGATIVE
TROPONIN T SERPL-MCNC: 0.02 NG/ML (ref 0–0.03)
TSH SERPL DL<=0.05 MIU/L-ACNC: 0.22 UIU/ML (ref 0.27–4.2)
UROBILINOGEN UR QL STRIP: ABNORMAL
VANCOMYCIN SERPL-MCNC: 15.8 MCG/ML (ref 5–40)
VENTILATOR MODE: ABNORMAL
VENTILATOR MODE: ABNORMAL
WBC # UR STRIP: NORMAL /HPF
WBC NRBC COR # BLD: 10 10*3/MM3 (ref 3.4–10.8)
WBC NRBC COR # BLD: 10.79 10*3/MM3 (ref 3.4–10.8)
WBC NRBC COR # BLD: 11.01 10*3/MM3 (ref 3.4–10.8)
WBC NRBC COR # BLD: 13.73 10*3/MM3 (ref 3.4–10.8)
WBC NRBC COR # BLD: 13.76 10*3/MM3 (ref 3.4–10.8)
WBC NRBC COR # BLD: 14.93 10*3/MM3 (ref 3.4–10.8)
WBC NRBC COR # BLD: 16.41 10*3/MM3 (ref 3.4–10.8)
WBC NRBC COR # BLD: 17.06 10*3/MM3 (ref 3.4–10.8)
WBC NRBC COR # BLD: 17.46 10*3/MM3 (ref 3.4–10.8)
WBC NRBC COR # BLD: 6.41 10*3/MM3 (ref 3.4–10.8)

## 2022-01-01 PROCEDURE — 99232 SBSQ HOSP IP/OBS MODERATE 35: CPT | Performed by: INTERNAL MEDICINE

## 2022-01-01 PROCEDURE — 63710000001 PREDNISONE PER 1 MG: Performed by: INTERNAL MEDICINE

## 2022-01-01 PROCEDURE — 85610 PROTHROMBIN TIME: CPT | Performed by: INTERNAL MEDICINE

## 2022-01-01 PROCEDURE — 25010000002 LEVOFLOXACIN PER 250 MG: Performed by: INTERNAL MEDICINE

## 2022-01-01 PROCEDURE — 94668 MNPJ CHEST WALL SBSQ: CPT

## 2022-01-01 PROCEDURE — 80048 BASIC METABOLIC PNL TOTAL CA: CPT | Performed by: INTERNAL MEDICINE

## 2022-01-01 PROCEDURE — 94799 UNLISTED PULMONARY SVC/PX: CPT

## 2022-01-01 PROCEDURE — 94669 MECHANICAL CHEST WALL OSCILL: CPT

## 2022-01-01 PROCEDURE — 83735 ASSAY OF MAGNESIUM: CPT | Performed by: NURSE PRACTITIONER

## 2022-01-01 PROCEDURE — 93005 ELECTROCARDIOGRAM TRACING: CPT | Performed by: INTERNAL MEDICINE

## 2022-01-01 PROCEDURE — 87641 MR-STAPH DNA AMP PROBE: CPT

## 2022-01-01 PROCEDURE — 25010000002 VANCOMYCIN 10 G RECONSTITUTED SOLUTION

## 2022-01-01 PROCEDURE — 94664 DEMO&/EVAL PT USE INHALER: CPT

## 2022-01-01 PROCEDURE — 97530 THERAPEUTIC ACTIVITIES: CPT

## 2022-01-01 PROCEDURE — 80306 DRUG TEST PRSMV INSTRMNT: CPT | Performed by: NURSE PRACTITIONER

## 2022-01-01 PROCEDURE — 25010000002 ENOXAPARIN PER 10 MG: Performed by: NURSE PRACTITIONER

## 2022-01-01 PROCEDURE — 0202U NFCT DS 22 TRGT SARS-COV-2: CPT | Performed by: NURSE PRACTITIONER

## 2022-01-01 PROCEDURE — 25010000002 KETOROLAC TROMETHAMINE PER 15 MG: Performed by: INTERNAL MEDICINE

## 2022-01-01 PROCEDURE — 94761 N-INVAS EAR/PLS OXIMETRY MLT: CPT

## 2022-01-01 PROCEDURE — 85027 COMPLETE CBC AUTOMATED: CPT | Performed by: INTERNAL MEDICINE

## 2022-01-01 PROCEDURE — 36600 WITHDRAWAL OF ARTERIAL BLOOD: CPT

## 2022-01-01 PROCEDURE — 84145 PROCALCITONIN (PCT): CPT | Performed by: INTERNAL MEDICINE

## 2022-01-01 PROCEDURE — 82375 ASSAY CARBOXYHB QUANT: CPT

## 2022-01-01 PROCEDURE — 71045 X-RAY EXAM CHEST 1 VIEW: CPT

## 2022-01-01 PROCEDURE — 63710000001 PREDNISONE PER 1 MG: Performed by: NURSE PRACTITIONER

## 2022-01-01 PROCEDURE — 94667 MNPJ CHEST WALL 1ST: CPT

## 2022-01-01 PROCEDURE — 94660 CPAP INITIATION&MGMT: CPT

## 2022-01-01 PROCEDURE — 99291 CRITICAL CARE FIRST HOUR: CPT | Performed by: INTERNAL MEDICINE

## 2022-01-01 PROCEDURE — 82805 BLOOD GASES W/O2 SATURATION: CPT

## 2022-01-01 PROCEDURE — 83605 ASSAY OF LACTIC ACID: CPT | Performed by: NURSE PRACTITIONER

## 2022-01-01 PROCEDURE — 85610 PROTHROMBIN TIME: CPT | Performed by: NURSE PRACTITIONER

## 2022-01-01 PROCEDURE — 25010000002 DIAZEPAM PER 5 MG: Performed by: INTERNAL MEDICINE

## 2022-01-01 PROCEDURE — 97162 PT EVAL MOD COMPLEX 30 MIN: CPT

## 2022-01-01 PROCEDURE — 84439 ASSAY OF FREE THYROXINE: CPT | Performed by: NURSE PRACTITIONER

## 2022-01-01 PROCEDURE — 99231 SBSQ HOSP IP/OBS SF/LOW 25: CPT | Performed by: INTERNAL MEDICINE

## 2022-01-01 PROCEDURE — 82962 GLUCOSE BLOOD TEST: CPT

## 2022-01-01 PROCEDURE — 25010000002 ACETAZOLAMIDE PER 500 MG: Performed by: INTERNAL MEDICINE

## 2022-01-01 PROCEDURE — 25010000002 MORPHINE PER 10 MG: Performed by: INTERNAL MEDICINE

## 2022-01-01 PROCEDURE — 25010000002 PIPERACILLIN SOD-TAZOBACTAM PER 1 G: Performed by: INTERNAL MEDICINE

## 2022-01-01 PROCEDURE — 99238 HOSP IP/OBS DSCHRG MGMT 30/<: CPT | Performed by: INTERNAL MEDICINE

## 2022-01-01 PROCEDURE — 82330 ASSAY OF CALCIUM: CPT | Performed by: NURSE PRACTITIONER

## 2022-01-01 PROCEDURE — 84100 ASSAY OF PHOSPHORUS: CPT | Performed by: INTERNAL MEDICINE

## 2022-01-01 PROCEDURE — 87070 CULTURE OTHR SPECIMN AEROBIC: CPT | Performed by: NURSE PRACTITIONER

## 2022-01-01 PROCEDURE — 84443 ASSAY THYROID STIM HORMONE: CPT | Performed by: NURSE PRACTITIONER

## 2022-01-01 PROCEDURE — 84100 ASSAY OF PHOSPHORUS: CPT | Performed by: NURSE PRACTITIONER

## 2022-01-01 PROCEDURE — 25010000002 VANCOMYCIN PER 500 MG

## 2022-01-01 PROCEDURE — 97110 THERAPEUTIC EXERCISES: CPT

## 2022-01-01 PROCEDURE — 85027 COMPLETE CBC AUTOMATED: CPT | Performed by: NURSE PRACTITIONER

## 2022-01-01 PROCEDURE — 99233 SBSQ HOSP IP/OBS HIGH 50: CPT | Performed by: INTERNAL MEDICINE

## 2022-01-01 PROCEDURE — 83050 HGB METHEMOGLOBIN QUAN: CPT

## 2022-01-01 PROCEDURE — 84484 ASSAY OF TROPONIN QUANT: CPT | Performed by: NURSE PRACTITIONER

## 2022-01-01 PROCEDURE — 87186 SC STD MICRODIL/AGAR DIL: CPT | Performed by: NURSE PRACTITIONER

## 2022-01-01 PROCEDURE — 94640 AIRWAY INHALATION TREATMENT: CPT

## 2022-01-01 PROCEDURE — 93010 ELECTROCARDIOGRAM REPORT: CPT | Performed by: STUDENT IN AN ORGANIZED HEALTH CARE EDUCATION/TRAINING PROGRAM

## 2022-01-01 PROCEDURE — 93010 ELECTROCARDIOGRAM REPORT: CPT | Performed by: INTERNAL MEDICINE

## 2022-01-01 PROCEDURE — 83735 ASSAY OF MAGNESIUM: CPT | Performed by: INTERNAL MEDICINE

## 2022-01-01 PROCEDURE — 25010000002 IOPAMIDOL 61 % SOLUTION: Performed by: INTERNAL MEDICINE

## 2022-01-01 PROCEDURE — 87205 SMEAR GRAM STAIN: CPT | Performed by: NURSE PRACTITIONER

## 2022-01-01 PROCEDURE — 84145 PROCALCITONIN (PCT): CPT | Performed by: NURSE PRACTITIONER

## 2022-01-01 PROCEDURE — 83880 ASSAY OF NATRIURETIC PEPTIDE: CPT | Performed by: NURSE PRACTITIONER

## 2022-01-01 PROCEDURE — 80053 COMPREHEN METABOLIC PANEL: CPT | Performed by: NURSE PRACTITIONER

## 2022-01-01 PROCEDURE — 97535 SELF CARE MNGMENT TRAINING: CPT

## 2022-01-01 PROCEDURE — 85025 COMPLETE CBC W/AUTO DIFF WBC: CPT | Performed by: INTERNAL MEDICINE

## 2022-01-01 PROCEDURE — 71260 CT THORAX DX C+: CPT

## 2022-01-01 PROCEDURE — 97166 OT EVAL MOD COMPLEX 45 MIN: CPT

## 2022-01-01 PROCEDURE — 87181 SC STD AGAR DILUTION PER AGT: CPT | Performed by: NURSE PRACTITIONER

## 2022-01-01 PROCEDURE — 80202 ASSAY OF VANCOMYCIN: CPT

## 2022-01-01 PROCEDURE — 80048 BASIC METABOLIC PNL TOTAL CA: CPT | Performed by: NURSE PRACTITIONER

## 2022-01-01 PROCEDURE — 25010000002 MORPHINE PER 10 MG: Performed by: FAMILY MEDICINE

## 2022-01-01 PROCEDURE — 81001 URINALYSIS AUTO W/SCOPE: CPT | Performed by: NURSE PRACTITIONER

## 2022-01-01 RX ORDER — SODIUM CHLORIDE 0.9 % (FLUSH) 0.9 %
10 SYRINGE (ML) INJECTION EVERY 12 HOURS SCHEDULED
Status: CANCELLED | OUTPATIENT
Start: 2022-01-01

## 2022-01-01 RX ORDER — POLYETHYLENE GLYCOL 3350 17 G/17G
17 POWDER, FOR SOLUTION ORAL DAILY PRN
Status: DISCONTINUED | OUTPATIENT
Start: 2022-01-01 | End: 2022-01-01 | Stop reason: HOSPADM

## 2022-01-01 RX ORDER — ACETAMINOPHEN 325 MG/1
650 TABLET ORAL EVERY 4 HOURS PRN
Status: DISCONTINUED | OUTPATIENT
Start: 2022-01-01 | End: 2022-01-01 | Stop reason: HOSPADM

## 2022-01-01 RX ORDER — AMOXICILLIN 250 MG
2 CAPSULE ORAL 2 TIMES DAILY
Status: DISCONTINUED | OUTPATIENT
Start: 2022-01-01 | End: 2022-01-01 | Stop reason: HOSPADM

## 2022-01-01 RX ORDER — MAGNESIUM SULFATE HEPTAHYDRATE 40 MG/ML
2 INJECTION, SOLUTION INTRAVENOUS AS NEEDED
Status: DISCONTINUED | OUTPATIENT
Start: 2022-01-01 | End: 2022-01-01 | Stop reason: HOSPADM

## 2022-01-01 RX ORDER — BUSPIRONE HYDROCHLORIDE 15 MG/1
7.5 TABLET ORAL 3 TIMES DAILY
Status: DISCONTINUED | OUTPATIENT
Start: 2022-01-01 | End: 2022-01-01 | Stop reason: HOSPADM

## 2022-01-01 RX ORDER — POTASSIUM CHLORIDE 7.45 MG/ML
10 INJECTION INTRAVENOUS
Status: DISCONTINUED | OUTPATIENT
Start: 2022-01-01 | End: 2022-01-01

## 2022-01-01 RX ORDER — NICOTINE 21 MG/24HR
1 PATCH, TRANSDERMAL 24 HOURS TRANSDERMAL
Status: DISCONTINUED | OUTPATIENT
Start: 2022-01-01 | End: 2022-01-01 | Stop reason: HOSPADM

## 2022-01-01 RX ORDER — BUSPIRONE HYDROCHLORIDE 15 MG/1
7.5 TABLET ORAL 3 TIMES DAILY
COMMUNITY

## 2022-01-01 RX ORDER — PREDNISONE 20 MG/1
40 TABLET ORAL 2 TIMES DAILY WITH MEALS
Status: DISCONTINUED | OUTPATIENT
Start: 2022-01-01 | End: 2022-01-01

## 2022-01-01 RX ORDER — ACETAMINOPHEN 650 MG/1
650 SUPPOSITORY RECTAL EVERY 4 HOURS PRN
Status: DISCONTINUED | OUTPATIENT
Start: 2022-01-01 | End: 2022-01-01 | Stop reason: HOSPADM

## 2022-01-01 RX ORDER — L.ACID,PARA/B.BIFIDUM/S.THERM 8B CELL
1 CAPSULE ORAL DAILY
Status: DISCONTINUED | OUTPATIENT
Start: 2022-01-01 | End: 2022-01-01 | Stop reason: HOSPADM

## 2022-01-01 RX ORDER — PREDNISONE 20 MG/1
40 TABLET ORAL
Status: DISCONTINUED | OUTPATIENT
Start: 2022-01-01 | End: 2022-01-01 | Stop reason: HOSPADM

## 2022-01-01 RX ORDER — ESCITALOPRAM OXALATE 20 MG/1
20 TABLET ORAL DAILY
Status: DISCONTINUED | OUTPATIENT
Start: 2022-01-01 | End: 2022-01-01

## 2022-01-01 RX ORDER — BISACODYL 10 MG
10 SUPPOSITORY, RECTAL RECTAL DAILY PRN
Status: DISCONTINUED | OUTPATIENT
Start: 2022-01-01 | End: 2022-01-01 | Stop reason: HOSPADM

## 2022-01-01 RX ORDER — PREDNISONE 20 MG/1
40 TABLET ORAL DAILY
Status: DISCONTINUED | OUTPATIENT
Start: 2022-01-01 | End: 2022-01-01

## 2022-01-01 RX ORDER — SODIUM CHLORIDE 0.9 % (FLUSH) 0.9 %
10 SYRINGE (ML) INJECTION AS NEEDED
Status: DISCONTINUED | OUTPATIENT
Start: 2022-01-01 | End: 2022-01-01 | Stop reason: HOSPADM

## 2022-01-01 RX ORDER — LISINOPRIL 5 MG/1
5 TABLET ORAL DAILY
Status: CANCELLED | OUTPATIENT
Start: 2022-01-01

## 2022-01-01 RX ORDER — BUDESONIDE 0.5 MG/2ML
0.5 INHALANT ORAL
Status: CANCELLED | OUTPATIENT
Start: 2022-01-01

## 2022-01-01 RX ORDER — MAGNESIUM SULFATE HEPTAHYDRATE 40 MG/ML
4 INJECTION, SOLUTION INTRAVENOUS AS NEEDED
Status: CANCELLED | OUTPATIENT
Start: 2022-01-01

## 2022-01-01 RX ORDER — MAGNESIUM SULFATE HEPTAHYDRATE 40 MG/ML
2 INJECTION, SOLUTION INTRAVENOUS AS NEEDED
Status: CANCELLED | OUTPATIENT
Start: 2022-01-01

## 2022-01-01 RX ORDER — DOXEPIN HYDROCHLORIDE 10 MG/1
10 CAPSULE ORAL NIGHTLY
Status: DISCONTINUED | OUTPATIENT
Start: 2022-01-01 | End: 2022-01-01 | Stop reason: HOSPADM

## 2022-01-01 RX ORDER — SODIUM CHLORIDE 9 MG/ML
100 INJECTION, SOLUTION INTRAVENOUS CONTINUOUS
Status: DISCONTINUED | OUTPATIENT
Start: 2022-01-01 | End: 2022-01-01

## 2022-01-01 RX ORDER — LISINOPRIL 5 MG/1
5 TABLET ORAL DAILY
Status: DISCONTINUED | OUTPATIENT
Start: 2022-01-01 | End: 2022-01-01 | Stop reason: HOSPADM

## 2022-01-01 RX ORDER — SODIUM CHLORIDE FOR INHALATION 7 %
4 VIAL, NEBULIZER (ML) INHALATION
Status: DISCONTINUED | OUTPATIENT
Start: 2022-01-01 | End: 2022-01-01

## 2022-01-01 RX ORDER — BISACODYL 5 MG/1
5 TABLET, DELAYED RELEASE ORAL DAILY PRN
Status: CANCELLED | OUTPATIENT
Start: 2022-01-01

## 2022-01-01 RX ORDER — MORPHINE SULFATE 2 MG/ML
1 INJECTION, SOLUTION INTRAMUSCULAR; INTRAVENOUS EVERY 8 HOURS
Status: DISCONTINUED | OUTPATIENT
Start: 2022-01-01 | End: 2022-01-01 | Stop reason: HOSPADM

## 2022-01-01 RX ORDER — ACETAMINOPHEN 325 MG/1
650 TABLET ORAL EVERY 4 HOURS PRN
Status: DISCONTINUED | OUTPATIENT
Start: 2022-01-01 | End: 2022-01-01

## 2022-01-01 RX ORDER — FLECAINIDE ACETATE 50 MG/1
50 TABLET ORAL EVERY 12 HOURS SCHEDULED
Status: DISCONTINUED | OUTPATIENT
Start: 2022-01-01 | End: 2022-01-01 | Stop reason: HOSPADM

## 2022-01-01 RX ORDER — ESCITALOPRAM OXALATE 20 MG/1
20 TABLET ORAL DAILY
COMMUNITY

## 2022-01-01 RX ORDER — BUSPIRONE HYDROCHLORIDE 15 MG/1
7.5 TABLET ORAL 3 TIMES DAILY
Status: CANCELLED | OUTPATIENT
Start: 2022-01-01

## 2022-01-01 RX ORDER — PANTOPRAZOLE SODIUM 40 MG/10ML
40 INJECTION, POWDER, LYOPHILIZED, FOR SOLUTION INTRAVENOUS
Status: DISCONTINUED | OUTPATIENT
Start: 2022-01-01 | End: 2022-01-01

## 2022-01-01 RX ORDER — ACETAMINOPHEN 650 MG/1
650 SUPPOSITORY RECTAL EVERY 4 HOURS PRN
Status: DISCONTINUED | OUTPATIENT
Start: 2022-01-01 | End: 2022-01-01

## 2022-01-01 RX ORDER — MORPHINE SULFATE 4 MG/ML
4 INJECTION, SOLUTION INTRAMUSCULAR; INTRAVENOUS
Status: DISCONTINUED | OUTPATIENT
Start: 2022-01-01 | End: 2022-01-01 | Stop reason: HOSPADM

## 2022-01-01 RX ORDER — DIAZEPAM 5 MG/ML
2.5 INJECTION, SOLUTION INTRAMUSCULAR; INTRAVENOUS EVERY 8 HOURS
Status: DISCONTINUED | OUTPATIENT
Start: 2022-01-01 | End: 2022-01-01 | Stop reason: HOSPADM

## 2022-01-01 RX ORDER — POTASSIUM CHLORIDE 1.5 G/1.77G
40 POWDER, FOR SOLUTION ORAL AS NEEDED
Status: DISCONTINUED | OUTPATIENT
Start: 2022-01-01 | End: 2022-01-01 | Stop reason: HOSPADM

## 2022-01-01 RX ORDER — GLYCOPYRROLATE 0.2 MG/ML
0.4 INJECTION INTRAMUSCULAR; INTRAVENOUS EVERY 4 HOURS PRN
Status: DISCONTINUED | OUTPATIENT
Start: 2022-01-01 | End: 2022-01-01 | Stop reason: HOSPADM

## 2022-01-01 RX ORDER — HALOPERIDOL 5 MG/ML
1 INJECTION INTRAMUSCULAR EVERY 4 HOURS PRN
Status: DISCONTINUED | OUTPATIENT
Start: 2022-01-01 | End: 2022-01-01 | Stop reason: HOSPADM

## 2022-01-01 RX ORDER — MORPHINE SULFATE 2 MG/ML
2 INJECTION, SOLUTION INTRAMUSCULAR; INTRAVENOUS
Status: DISCONTINUED | OUTPATIENT
Start: 2022-01-01 | End: 2022-01-01 | Stop reason: HOSPADM

## 2022-01-01 RX ORDER — FLECAINIDE ACETATE 50 MG/1
50 TABLET ORAL EVERY 12 HOURS SCHEDULED
Status: CANCELLED | OUTPATIENT
Start: 2022-01-01

## 2022-01-01 RX ORDER — SODIUM CHLORIDE 0.9 % (FLUSH) 0.9 %
10 SYRINGE (ML) INJECTION AS NEEDED
Status: CANCELLED | OUTPATIENT
Start: 2022-01-01

## 2022-01-01 RX ORDER — PREDNISONE 20 MG/1
40 TABLET ORAL
Status: CANCELLED | OUTPATIENT
Start: 2022-01-01

## 2022-01-01 RX ORDER — MIRTAZAPINE 15 MG/1
7.5 TABLET, FILM COATED ORAL NIGHTLY
COMMUNITY

## 2022-01-01 RX ORDER — ACETAMINOPHEN 325 MG/1
650 TABLET ORAL EVERY 4 HOURS PRN
Status: CANCELLED | OUTPATIENT
Start: 2022-01-01

## 2022-01-01 RX ORDER — AMOXICILLIN 250 MG
2 CAPSULE ORAL 2 TIMES DAILY PRN
Status: DISCONTINUED | OUTPATIENT
Start: 2022-01-01 | End: 2022-01-01 | Stop reason: HOSPADM

## 2022-01-01 RX ORDER — BUDESONIDE 0.5 MG/2ML
0.5 INHALANT ORAL
Status: DISCONTINUED | OUTPATIENT
Start: 2022-01-01 | End: 2022-01-01 | Stop reason: HOSPADM

## 2022-01-01 RX ORDER — LISINOPRIL 5 MG/1
5 TABLET ORAL DAILY
COMMUNITY

## 2022-01-01 RX ORDER — LEVOFLOXACIN 750 MG/1
750 TABLET ORAL DAILY
Status: CANCELLED | OUTPATIENT
Start: 2022-01-01 | End: 2022-01-01

## 2022-01-01 RX ORDER — VANCOMYCIN HYDROCHLORIDE 1 G/200ML
1000 INJECTION, SOLUTION INTRAVENOUS
Status: DISCONTINUED | OUTPATIENT
Start: 2022-01-01 | End: 2022-01-01

## 2022-01-01 RX ORDER — ESCITALOPRAM OXALATE 20 MG/1
20 TABLET ORAL DAILY
Status: CANCELLED | OUTPATIENT
Start: 2022-01-01

## 2022-01-01 RX ORDER — POLYETHYLENE GLYCOL 3350 17 G/17G
17 POWDER, FOR SOLUTION ORAL DAILY PRN
Status: CANCELLED | OUTPATIENT
Start: 2022-01-01

## 2022-01-01 RX ORDER — METHYLPREDNISOLONE SODIUM SUCCINATE 40 MG/ML
20 INJECTION, POWDER, LYOPHILIZED, FOR SOLUTION INTRAMUSCULAR; INTRAVENOUS EVERY 8 HOURS SCHEDULED
Status: DISCONTINUED | OUTPATIENT
Start: 2022-01-01 | End: 2022-01-01

## 2022-01-01 RX ORDER — MORPHINE SULFATE 2 MG/ML
2 INJECTION, SOLUTION INTRAMUSCULAR; INTRAVENOUS
Status: CANCELLED | OUTPATIENT
Start: 2022-01-01 | End: 2022-08-11

## 2022-01-01 RX ORDER — ESCITALOPRAM OXALATE 20 MG/1
20 TABLET ORAL DAILY
Status: DISCONTINUED | OUTPATIENT
Start: 2022-01-01 | End: 2022-01-01 | Stop reason: HOSPADM

## 2022-01-01 RX ORDER — FENTANYL/ROPIVACAINE/NS/PF 2-625MCG/1
15 PLASTIC BAG, INJECTION (ML) EPIDURAL AS NEEDED
Status: DISCONTINUED | OUTPATIENT
Start: 2022-01-01 | End: 2022-01-01

## 2022-01-01 RX ORDER — SODIUM CHLORIDE 0.9 % (FLUSH) 0.9 %
10 SYRINGE (ML) INJECTION EVERY 12 HOURS SCHEDULED
Status: DISCONTINUED | OUTPATIENT
Start: 2022-01-01 | End: 2022-01-01 | Stop reason: HOSPADM

## 2022-01-01 RX ORDER — IPRATROPIUM BROMIDE AND ALBUTEROL SULFATE 2.5; .5 MG/3ML; MG/3ML
3 SOLUTION RESPIRATORY (INHALATION)
Status: CANCELLED | OUTPATIENT
Start: 2022-01-01

## 2022-01-01 RX ORDER — L.ACID,PARA/B.BIFIDUM/S.THERM 8B CELL
1 CAPSULE ORAL DAILY
Status: CANCELLED | OUTPATIENT
Start: 2022-01-01

## 2022-01-01 RX ORDER — BISACODYL 5 MG/1
5 TABLET, DELAYED RELEASE ORAL DAILY PRN
Status: DISCONTINUED | OUTPATIENT
Start: 2022-01-01 | End: 2022-01-01 | Stop reason: HOSPADM

## 2022-01-01 RX ORDER — LEVOFLOXACIN 5 MG/ML
750 INJECTION, SOLUTION INTRAVENOUS
Status: DISCONTINUED | OUTPATIENT
Start: 2022-01-01 | End: 2022-01-01

## 2022-01-01 RX ORDER — BISACODYL 10 MG
10 SUPPOSITORY, RECTAL RECTAL DAILY PRN
Status: CANCELLED | OUTPATIENT
Start: 2022-01-01

## 2022-01-01 RX ORDER — IPRATROPIUM BROMIDE AND ALBUTEROL SULFATE 2.5; .5 MG/3ML; MG/3ML
3 SOLUTION RESPIRATORY (INHALATION)
Status: DISCONTINUED | OUTPATIENT
Start: 2022-01-01 | End: 2022-01-01 | Stop reason: HOSPADM

## 2022-01-01 RX ORDER — ACETAZOLAMIDE SODIUM 500 MG/5ML
500 INJECTION, POWDER, LYOPHILIZED, FOR SOLUTION INTRAVENOUS ONCE
Status: COMPLETED | OUTPATIENT
Start: 2022-01-01 | End: 2022-01-01

## 2022-01-01 RX ORDER — ACETAMINOPHEN 650 MG/1
650 SUPPOSITORY RECTAL EVERY 4 HOURS PRN
Status: CANCELLED | OUTPATIENT
Start: 2022-01-01

## 2022-01-01 RX ORDER — POTASSIUM CHLORIDE 750 MG/1
40 CAPSULE, EXTENDED RELEASE ORAL AS NEEDED
Status: DISCONTINUED | OUTPATIENT
Start: 2022-01-01 | End: 2022-01-01 | Stop reason: HOSPADM

## 2022-01-01 RX ORDER — DIAZEPAM 5 MG/ML
5 INJECTION, SOLUTION INTRAMUSCULAR; INTRAVENOUS EVERY 4 HOURS PRN
Status: DISCONTINUED | OUTPATIENT
Start: 2022-01-01 | End: 2022-01-01 | Stop reason: HOSPADM

## 2022-01-01 RX ORDER — ENOXAPARIN SODIUM 100 MG/ML
40 INJECTION SUBCUTANEOUS DAILY
Status: DISCONTINUED | OUTPATIENT
Start: 2022-01-01 | End: 2022-01-01

## 2022-01-01 RX ORDER — KETOROLAC TROMETHAMINE 15 MG/ML
15 INJECTION, SOLUTION INTRAMUSCULAR; INTRAVENOUS EVERY 6 HOURS PRN
Status: DISCONTINUED | OUTPATIENT
Start: 2022-01-01 | End: 2022-01-01 | Stop reason: HOSPADM

## 2022-01-01 RX ORDER — POTASSIUM CHLORIDE 750 MG/1
40 CAPSULE, EXTENDED RELEASE ORAL AS NEEDED
Status: CANCELLED | OUTPATIENT
Start: 2022-01-01

## 2022-01-01 RX ORDER — NICOTINE 21 MG/24HR
1 PATCH, TRANSDERMAL 24 HOURS TRANSDERMAL
Status: CANCELLED | OUTPATIENT
Start: 2022-01-01

## 2022-01-01 RX ORDER — MORPHINE SULFATE 2 MG/ML
2 INJECTION, SOLUTION INTRAMUSCULAR; INTRAVENOUS
Status: DISCONTINUED | OUTPATIENT
Start: 2022-01-01 | End: 2022-01-01

## 2022-01-01 RX ORDER — SCOLOPAMINE TRANSDERMAL SYSTEM 1 MG/1
1 PATCH, EXTENDED RELEASE TRANSDERMAL
Status: DISCONTINUED | OUTPATIENT
Start: 2022-01-01 | End: 2022-01-01 | Stop reason: HOSPADM

## 2022-01-01 RX ORDER — POTASSIUM CHLORIDE 1.5 G/1.77G
40 POWDER, FOR SOLUTION ORAL AS NEEDED
Status: CANCELLED | OUTPATIENT
Start: 2022-01-01

## 2022-01-01 RX ORDER — AMOXICILLIN 250 MG
2 CAPSULE ORAL 2 TIMES DAILY
Status: CANCELLED | OUTPATIENT
Start: 2022-01-01

## 2022-01-01 RX ORDER — LEVOFLOXACIN 750 MG/1
750 TABLET ORAL DAILY
Status: DISCONTINUED | OUTPATIENT
Start: 2022-01-01 | End: 2022-01-01 | Stop reason: HOSPADM

## 2022-01-01 RX ORDER — PREDNISONE 1 MG/1
5 TABLET ORAL DAILY
COMMUNITY

## 2022-01-01 RX ORDER — MAGNESIUM SULFATE HEPTAHYDRATE 40 MG/ML
4 INJECTION, SOLUTION INTRAVENOUS AS NEEDED
Status: DISCONTINUED | OUTPATIENT
Start: 2022-01-01 | End: 2022-01-01 | Stop reason: HOSPADM

## 2022-01-01 RX ADMIN — BUSPIRONE HYDROCHLORIDE 7.5 MG: 5 TABLET ORAL at 16:33

## 2022-01-01 RX ADMIN — LEVOFLOXACIN 750 MG: 750 TABLET, FILM COATED ORAL at 09:33

## 2022-01-01 RX ADMIN — BUSPIRONE HYDROCHLORIDE 7.5 MG: 5 TABLET ORAL at 09:14

## 2022-01-01 RX ADMIN — BUDESONIDE 0.5 MG: 0.5 SUSPENSION RESPIRATORY (INHALATION) at 07:05

## 2022-01-01 RX ADMIN — IPRATROPIUM BROMIDE AND ALBUTEROL SULFATE 3 ML: .5; 3 SOLUTION RESPIRATORY (INHALATION) at 07:26

## 2022-01-01 RX ADMIN — BUDESONIDE 0.5 MG: 0.5 SUSPENSION RESPIRATORY (INHALATION) at 07:58

## 2022-01-01 RX ADMIN — IPRATROPIUM BROMIDE AND ALBUTEROL SULFATE 3 ML: .5; 3 SOLUTION RESPIRATORY (INHALATION) at 09:08

## 2022-01-01 RX ADMIN — MORPHINE SULFATE 4 MG: 4 INJECTION, SOLUTION INTRAMUSCULAR; INTRAVENOUS at 20:33

## 2022-01-01 RX ADMIN — FLECAINIDE ACETATE 50 MG: 50 TABLET ORAL at 20:01

## 2022-01-01 RX ADMIN — PANTOPRAZOLE SODIUM 40 MG: 40 INJECTION, POWDER, FOR SOLUTION INTRAVENOUS at 05:45

## 2022-01-01 RX ADMIN — DIAZEPAM 5 MG: 5 INJECTION, SOLUTION INTRAMUSCULAR; INTRAVENOUS at 20:34

## 2022-01-01 RX ADMIN — PREDNISONE 40 MG: 20 TABLET ORAL at 08:38

## 2022-01-01 RX ADMIN — MINERAL OIL: 1000 LIQUID ORAL at 16:13

## 2022-01-01 RX ADMIN — BUDESONIDE 0.5 MG: 0.5 SUSPENSION RESPIRATORY (INHALATION) at 19:06

## 2022-01-01 RX ADMIN — FLECAINIDE ACETATE 50 MG: 50 TABLET ORAL at 20:23

## 2022-01-01 RX ADMIN — APIXABAN 5 MG: 5 TABLET, FILM COATED ORAL at 08:14

## 2022-01-01 RX ADMIN — PREDNISONE 40 MG: 20 TABLET ORAL at 18:04

## 2022-01-01 RX ADMIN — IPRATROPIUM BROMIDE AND ALBUTEROL SULFATE 3 ML: .5; 3 SOLUTION RESPIRATORY (INHALATION) at 07:58

## 2022-01-01 RX ADMIN — FLECAINIDE ACETATE 50 MG: 50 TABLET ORAL at 20:39

## 2022-01-01 RX ADMIN — PREDNISONE 40 MG: 20 TABLET ORAL at 09:16

## 2022-01-01 RX ADMIN — Medication 1 PATCH: at 08:57

## 2022-01-01 RX ADMIN — BUDESONIDE 0.5 MG: 0.5 SUSPENSION RESPIRATORY (INHALATION) at 19:29

## 2022-01-01 RX ADMIN — BUSPIRONE HYDROCHLORIDE 7.5 MG: 5 TABLET ORAL at 21:05

## 2022-01-01 RX ADMIN — LEVOFLOXACIN 750 MG: 750 INJECTION, SOLUTION INTRAVENOUS at 08:48

## 2022-01-01 RX ADMIN — PANTOPRAZOLE SODIUM 40 MG: 40 INJECTION, POWDER, FOR SOLUTION INTRAVENOUS at 06:31

## 2022-01-01 RX ADMIN — MINERAL OIL: 1000 LIQUID ORAL at 08:14

## 2022-01-01 RX ADMIN — Medication 1 PATCH: at 08:16

## 2022-01-01 RX ADMIN — MINERAL OIL: 1000 LIQUID ORAL at 08:38

## 2022-01-01 RX ADMIN — DIAZEPAM 2.5 MG: 5 INJECTION, SOLUTION INTRAMUSCULAR; INTRAVENOUS at 16:53

## 2022-01-01 RX ADMIN — BUDESONIDE 0.5 MG: 0.5 SUSPENSION RESPIRATORY (INHALATION) at 21:03

## 2022-01-01 RX ADMIN — GLYCOPYRROLATE 0.4 MG: 0.2 INJECTION INTRAMUSCULAR; INTRAVENOUS at 20:34

## 2022-01-01 RX ADMIN — IPRATROPIUM BROMIDE AND ALBUTEROL SULFATE 3 ML: .5; 3 SOLUTION RESPIRATORY (INHALATION) at 07:05

## 2022-01-01 RX ADMIN — IPRATROPIUM BROMIDE AND ALBUTEROL SULFATE 3 ML: .5; 3 SOLUTION RESPIRATORY (INHALATION) at 08:14

## 2022-01-01 RX ADMIN — DOXYCYCLINE 100 MG: 100 INJECTION, POWDER, LYOPHILIZED, FOR SOLUTION INTRAVENOUS at 21:23

## 2022-01-01 RX ADMIN — DIAZEPAM 2.5 MG: 5 INJECTION, SOLUTION INTRAMUSCULAR; INTRAVENOUS at 00:35

## 2022-01-01 RX ADMIN — APIXABAN 5 MG: 5 TABLET, FILM COATED ORAL at 22:00

## 2022-01-01 RX ADMIN — Medication 1 PATCH: at 09:35

## 2022-01-01 RX ADMIN — PREDNISONE 40 MG: 20 TABLET ORAL at 17:39

## 2022-01-01 RX ADMIN — DIAZEPAM 2.5 MG: 5 INJECTION, SOLUTION INTRAMUSCULAR; INTRAVENOUS at 17:18

## 2022-01-01 RX ADMIN — Medication 1 PATCH: at 08:54

## 2022-01-01 RX ADMIN — BUSPIRONE HYDROCHLORIDE 7.5 MG: 5 TABLET ORAL at 08:16

## 2022-01-01 RX ADMIN — MORPHINE SULFATE 1 MG: 2 INJECTION, SOLUTION INTRAMUSCULAR; INTRAVENOUS at 23:49

## 2022-01-01 RX ADMIN — Medication 1 CAPSULE: at 08:13

## 2022-01-01 RX ADMIN — BISACODYL 10 MG: 10 SUPPOSITORY RECTAL at 00:58

## 2022-01-01 RX ADMIN — ENOXAPARIN SODIUM 40 MG: 40 INJECTION SUBCUTANEOUS at 17:22

## 2022-01-01 RX ADMIN — FLECAINIDE ACETATE 50 MG: 50 TABLET ORAL at 08:37

## 2022-01-01 RX ADMIN — APIXABAN 5 MG: 5 TABLET, FILM COATED ORAL at 20:57

## 2022-01-01 RX ADMIN — FLECAINIDE ACETATE 50 MG: 50 TABLET ORAL at 21:23

## 2022-01-01 RX ADMIN — ACETAZOLAMIDE 500 MG: 500 INJECTION, POWDER, LYOPHILIZED, FOR SOLUTION INTRAVENOUS at 11:41

## 2022-01-01 RX ADMIN — BUSPIRONE HYDROCHLORIDE 7.5 MG: 5 TABLET ORAL at 21:37

## 2022-01-01 RX ADMIN — TAZOBACTAM SODIUM AND PIPERACILLIN SODIUM 3.38 G: 375; 3 INJECTION, SOLUTION INTRAVENOUS at 21:23

## 2022-01-01 RX ADMIN — MINERAL OIL: 1000 LIQUID ORAL at 16:58

## 2022-01-01 RX ADMIN — FLECAINIDE ACETATE 50 MG: 50 TABLET ORAL at 08:14

## 2022-01-01 RX ADMIN — APIXABAN 5 MG: 5 TABLET, FILM COATED ORAL at 08:47

## 2022-01-01 RX ADMIN — BUDESONIDE 0.5 MG: 0.5 SUSPENSION RESPIRATORY (INHALATION) at 07:17

## 2022-01-01 RX ADMIN — PREDNISONE 40 MG: 20 TABLET ORAL at 08:16

## 2022-01-01 RX ADMIN — IOPAMIDOL 75 ML: 612 INJECTION, SOLUTION INTRAVENOUS at 11:18

## 2022-01-01 RX ADMIN — SODIUM CHLORIDE 100 ML/HR: 9 INJECTION, SOLUTION INTRAVENOUS at 20:41

## 2022-01-01 RX ADMIN — FLECAINIDE ACETATE 50 MG: 50 TABLET ORAL at 08:13

## 2022-01-01 RX ADMIN — Medication 1 PATCH: at 09:14

## 2022-01-01 RX ADMIN — PREDNISONE 40 MG: 20 TABLET ORAL at 08:13

## 2022-01-01 RX ADMIN — Medication 1 PATCH: at 08:15

## 2022-01-01 RX ADMIN — SENNOSIDES AND DOCUSATE SODIUM 2 TABLET: 50; 8.6 TABLET ORAL at 11:39

## 2022-01-01 RX ADMIN — Medication 10 ML: at 08:58

## 2022-01-01 RX ADMIN — MORPHINE SULFATE 1 MG: 2 INJECTION, SOLUTION INTRAMUSCULAR; INTRAVENOUS at 08:13

## 2022-01-01 RX ADMIN — FLECAINIDE ACETATE 50 MG: 50 TABLET ORAL at 09:15

## 2022-01-01 RX ADMIN — MORPHINE SULFATE 1 MG: 2 INJECTION, SOLUTION INTRAMUSCULAR; INTRAVENOUS at 00:35

## 2022-01-01 RX ADMIN — LISINOPRIL 5 MG: 5 TABLET ORAL at 10:14

## 2022-01-01 RX ADMIN — VANCOMYCIN HYDROCHLORIDE 1000 MG: 1 INJECTION, SOLUTION INTRAVENOUS at 18:04

## 2022-01-01 RX ADMIN — MINERAL OIL: 1000 LIQUID ORAL at 20:02

## 2022-01-01 RX ADMIN — PREDNISONE 40 MG: 20 TABLET ORAL at 09:33

## 2022-01-01 RX ADMIN — APIXABAN 5 MG: 5 TABLET, FILM COATED ORAL at 09:33

## 2022-01-01 RX ADMIN — LISINOPRIL 5 MG: 5 TABLET ORAL at 08:47

## 2022-01-01 RX ADMIN — FLECAINIDE ACETATE 50 MG: 50 TABLET ORAL at 08:47

## 2022-01-01 RX ADMIN — PREDNISONE 40 MG: 20 TABLET ORAL at 08:15

## 2022-01-01 RX ADMIN — IPRATROPIUM BROMIDE AND ALBUTEROL SULFATE 3 ML: .5; 3 SOLUTION RESPIRATORY (INHALATION) at 19:06

## 2022-01-01 RX ADMIN — IPRATROPIUM BROMIDE AND ALBUTEROL SULFATE 3 ML: .5; 3 SOLUTION RESPIRATORY (INHALATION) at 20:18

## 2022-01-01 RX ADMIN — LISINOPRIL 5 MG: 5 TABLET ORAL at 08:14

## 2022-01-01 RX ADMIN — TAZOBACTAM SODIUM AND PIPERACILLIN SODIUM 3.38 G: 375; 3 INJECTION, SOLUTION INTRAVENOUS at 14:10

## 2022-01-01 RX ADMIN — Medication 1 PATCH: at 08:19

## 2022-01-01 RX ADMIN — FLECAINIDE ACETATE 50 MG: 50 TABLET ORAL at 08:33

## 2022-01-01 RX ADMIN — BUSPIRONE HYDROCHLORIDE 7.5 MG: 5 TABLET ORAL at 17:02

## 2022-01-01 RX ADMIN — MINERAL OIL: 1000 LIQUID ORAL at 16:56

## 2022-01-01 RX ADMIN — IPRATROPIUM BROMIDE AND ALBUTEROL SULFATE 3 ML: .5; 3 SOLUTION RESPIRATORY (INHALATION) at 07:16

## 2022-01-01 RX ADMIN — ESCITALOPRAM OXALATE 20 MG: 20 TABLET ORAL at 10:13

## 2022-01-01 RX ADMIN — TAZOBACTAM SODIUM AND PIPERACILLIN SODIUM 3.38 G: 375; 3 INJECTION, SOLUTION INTRAVENOUS at 23:00

## 2022-01-01 RX ADMIN — LISINOPRIL 5 MG: 5 TABLET ORAL at 08:16

## 2022-01-01 RX ADMIN — IPRATROPIUM BROMIDE AND ALBUTEROL SULFATE 3 ML: .5; 3 SOLUTION RESPIRATORY (INHALATION) at 20:48

## 2022-01-01 RX ADMIN — Medication 1 CAPSULE: at 09:14

## 2022-01-01 RX ADMIN — Medication 10 ML: at 21:37

## 2022-01-01 RX ADMIN — TAZOBACTAM SODIUM AND PIPERACILLIN SODIUM 3.38 G: 375; 3 INJECTION, SOLUTION INTRAVENOUS at 05:41

## 2022-01-01 RX ADMIN — LEVOFLOXACIN 750 MG: 750 INJECTION, SOLUTION INTRAVENOUS at 12:41

## 2022-01-01 RX ADMIN — BUSPIRONE HYDROCHLORIDE 7.5 MG: 5 TABLET ORAL at 10:14

## 2022-01-01 RX ADMIN — IPRATROPIUM BROMIDE AND ALBUTEROL SULFATE 3 ML: .5; 3 SOLUTION RESPIRATORY (INHALATION) at 16:37

## 2022-01-01 RX ADMIN — IPRATROPIUM BROMIDE AND ALBUTEROL SULFATE 3 ML: .5; 3 SOLUTION RESPIRATORY (INHALATION) at 08:45

## 2022-01-01 RX ADMIN — IPRATROPIUM BROMIDE AND ALBUTEROL SULFATE 3 ML: .5; 3 SOLUTION RESPIRATORY (INHALATION) at 16:10

## 2022-01-01 RX ADMIN — Medication 10 ML: at 08:34

## 2022-01-01 RX ADMIN — IPRATROPIUM BROMIDE AND ALBUTEROL SULFATE 3 ML: .5; 3 SOLUTION RESPIRATORY (INHALATION) at 19:40

## 2022-01-01 RX ADMIN — DIAZEPAM 2.5 MG: 5 INJECTION, SOLUTION INTRAMUSCULAR; INTRAVENOUS at 16:09

## 2022-01-01 RX ADMIN — DIAZEPAM 2.5 MG: 5 INJECTION, SOLUTION INTRAMUSCULAR; INTRAVENOUS at 08:13

## 2022-01-01 RX ADMIN — DIAZEPAM 2.5 MG: 5 INJECTION, SOLUTION INTRAMUSCULAR; INTRAVENOUS at 00:08

## 2022-01-01 RX ADMIN — DIAZEPAM 2.5 MG: 5 INJECTION, SOLUTION INTRAMUSCULAR; INTRAVENOUS at 16:15

## 2022-01-01 RX ADMIN — MORPHINE SULFATE 1 MG: 2 INJECTION, SOLUTION INTRAMUSCULAR; INTRAVENOUS at 17:38

## 2022-01-01 RX ADMIN — IPRATROPIUM BROMIDE AND ALBUTEROL SULFATE 3 ML: .5; 3 SOLUTION RESPIRATORY (INHALATION) at 20:07

## 2022-01-01 RX ADMIN — Medication 1 PATCH: at 10:54

## 2022-01-01 RX ADMIN — IPRATROPIUM BROMIDE AND ALBUTEROL SULFATE 3 ML: .5; 3 SOLUTION RESPIRATORY (INHALATION) at 16:08

## 2022-01-01 RX ADMIN — PREDNISONE 40 MG: 20 TABLET ORAL at 09:14

## 2022-01-01 RX ADMIN — LISINOPRIL 5 MG: 5 TABLET ORAL at 09:13

## 2022-01-01 RX ADMIN — IPRATROPIUM BROMIDE AND ALBUTEROL SULFATE 3 ML: .5; 3 SOLUTION RESPIRATORY (INHALATION) at 11:54

## 2022-01-01 RX ADMIN — ACETAMINOPHEN 650 MG: 325 TABLET ORAL at 06:16

## 2022-01-01 RX ADMIN — DIAZEPAM 2.5 MG: 5 INJECTION, SOLUTION INTRAMUSCULAR; INTRAVENOUS at 08:39

## 2022-01-01 RX ADMIN — APIXABAN 5 MG: 5 TABLET, FILM COATED ORAL at 21:05

## 2022-01-01 RX ADMIN — ENOXAPARIN SODIUM 40 MG: 40 INJECTION SUBCUTANEOUS at 08:37

## 2022-01-01 RX ADMIN — BUDESONIDE 0.5 MG: 0.5 SUSPENSION RESPIRATORY (INHALATION) at 08:45

## 2022-01-01 RX ADMIN — PREDNISONE 40 MG: 20 TABLET ORAL at 18:18

## 2022-01-01 RX ADMIN — FLECAINIDE ACETATE 50 MG: 50 TABLET ORAL at 09:34

## 2022-01-01 RX ADMIN — APIXABAN 5 MG: 5 TABLET, FILM COATED ORAL at 08:58

## 2022-01-01 RX ADMIN — DOXYCYCLINE 100 MG: 100 INJECTION, POWDER, LYOPHILIZED, FOR SOLUTION INTRAVENOUS at 08:16

## 2022-01-01 RX ADMIN — LEVOFLOXACIN 750 MG: 750 TABLET, FILM COATED ORAL at 08:15

## 2022-01-01 RX ADMIN — IPRATROPIUM BROMIDE AND ALBUTEROL SULFATE 3 ML: .5; 3 SOLUTION RESPIRATORY (INHALATION) at 16:36

## 2022-01-01 RX ADMIN — FLECAINIDE ACETATE 50 MG: 50 TABLET ORAL at 20:35

## 2022-01-01 RX ADMIN — IPRATROPIUM BROMIDE AND ALBUTEROL SULFATE 3 ML: .5; 3 SOLUTION RESPIRATORY (INHALATION) at 20:21

## 2022-01-01 RX ADMIN — SENNOSIDES AND DOCUSATE SODIUM 2 TABLET: 50; 8.6 TABLET ORAL at 08:15

## 2022-01-01 RX ADMIN — Medication 10 ML: at 20:08

## 2022-01-01 RX ADMIN — IPRATROPIUM BROMIDE AND ALBUTEROL SULFATE 3 ML: .5; 3 SOLUTION RESPIRATORY (INHALATION) at 19:28

## 2022-01-01 RX ADMIN — ESCITALOPRAM OXALATE 20 MG: 20 TABLET ORAL at 08:57

## 2022-01-01 RX ADMIN — MORPHINE SULFATE 1 MG: 2 INJECTION, SOLUTION INTRAMUSCULAR; INTRAVENOUS at 08:38

## 2022-01-01 RX ADMIN — Medication 1 CAPSULE: at 09:13

## 2022-01-01 RX ADMIN — IPRATROPIUM BROMIDE AND ALBUTEROL SULFATE 3 ML: .5; 3 SOLUTION RESPIRATORY (INHALATION) at 06:49

## 2022-01-01 RX ADMIN — TAZOBACTAM SODIUM AND PIPERACILLIN SODIUM 3.38 G: 375; 3 INJECTION, SOLUTION INTRAVENOUS at 14:29

## 2022-01-01 RX ADMIN — IPRATROPIUM BROMIDE AND ALBUTEROL SULFATE 3 ML: .5; 3 SOLUTION RESPIRATORY (INHALATION) at 15:58

## 2022-01-01 RX ADMIN — Medication 10 ML: at 08:18

## 2022-01-01 RX ADMIN — PREDNISONE 40 MG: 20 TABLET ORAL at 18:10

## 2022-01-01 RX ADMIN — APIXABAN 5 MG: 5 TABLET, FILM COATED ORAL at 20:43

## 2022-01-01 RX ADMIN — ESCITALOPRAM OXALATE 20 MG: 20 TABLET ORAL at 09:13

## 2022-01-01 RX ADMIN — LISINOPRIL 5 MG: 5 TABLET ORAL at 08:57

## 2022-01-01 RX ADMIN — BUDESONIDE 0.5 MG: 0.5 SUSPENSION RESPIRATORY (INHALATION) at 19:45

## 2022-01-01 RX ADMIN — BUDESONIDE 0.5 MG: 0.5 SUSPENSION RESPIRATORY (INHALATION) at 08:14

## 2022-01-01 RX ADMIN — APIXABAN 5 MG: 5 TABLET, FILM COATED ORAL at 20:39

## 2022-01-01 RX ADMIN — FLECAINIDE ACETATE 50 MG: 50 TABLET ORAL at 22:00

## 2022-01-01 RX ADMIN — SENNOSIDES AND DOCUSATE SODIUM 2 TABLET: 50; 8.6 TABLET ORAL at 20:57

## 2022-01-01 RX ADMIN — SENNOSIDES AND DOCUSATE SODIUM 2 TABLET: 50; 8.6 TABLET ORAL at 09:14

## 2022-01-01 RX ADMIN — KETOROLAC TROMETHAMINE 15 MG: 15 INJECTION, SOLUTION INTRAMUSCULAR; INTRAVENOUS at 21:42

## 2022-01-01 RX ADMIN — MINERAL OIL: 1000 LIQUID ORAL at 21:42

## 2022-01-01 RX ADMIN — FLECAINIDE ACETATE 50 MG: 50 TABLET ORAL at 10:13

## 2022-01-01 RX ADMIN — BUDESONIDE 0.5 MG: 0.5 SUSPENSION RESPIRATORY (INHALATION) at 19:40

## 2022-01-01 RX ADMIN — LEVOFLOXACIN 750 MG: 750 TABLET, FILM COATED ORAL at 08:14

## 2022-01-01 RX ADMIN — BUSPIRONE HYDROCHLORIDE 7.5 MG: 5 TABLET ORAL at 20:42

## 2022-01-01 RX ADMIN — PREDNISONE 40 MG: 20 TABLET ORAL at 08:58

## 2022-01-01 RX ADMIN — SENNOSIDES AND DOCUSATE SODIUM 2 TABLET: 50; 8.6 TABLET ORAL at 22:00

## 2022-01-01 RX ADMIN — Medication 10 ML: at 21:07

## 2022-01-01 RX ADMIN — MORPHINE SULFATE 1 MG: 2 INJECTION, SOLUTION INTRAMUSCULAR; INTRAVENOUS at 16:53

## 2022-01-01 RX ADMIN — DOXYCYCLINE 100 MG: 100 INJECTION, POWDER, LYOPHILIZED, FOR SOLUTION INTRAVENOUS at 08:57

## 2022-01-01 RX ADMIN — MORPHINE SULFATE 1 MG: 2 INJECTION, SOLUTION INTRAMUSCULAR; INTRAVENOUS at 00:53

## 2022-01-01 RX ADMIN — BUSPIRONE HYDROCHLORIDE 7.5 MG: 5 TABLET ORAL at 08:31

## 2022-01-01 RX ADMIN — IPRATROPIUM BROMIDE AND ALBUTEROL SULFATE 3 ML: .5; 3 SOLUTION RESPIRATORY (INHALATION) at 19:24

## 2022-01-01 RX ADMIN — TAZOBACTAM SODIUM AND PIPERACILLIN SODIUM 3.38 G: 375; 3 INJECTION, SOLUTION INTRAVENOUS at 22:47

## 2022-01-01 RX ADMIN — IPRATROPIUM BROMIDE AND ALBUTEROL SULFATE 3 ML: .5; 3 SOLUTION RESPIRATORY (INHALATION) at 07:57

## 2022-01-01 RX ADMIN — MORPHINE SULFATE 1 MG: 2 INJECTION, SOLUTION INTRAMUSCULAR; INTRAVENOUS at 00:01

## 2022-01-01 RX ADMIN — IPRATROPIUM BROMIDE AND ALBUTEROL SULFATE 3 ML: .5; 3 SOLUTION RESPIRATORY (INHALATION) at 15:29

## 2022-01-01 RX ADMIN — BUDESONIDE 0.5 MG: 0.5 SUSPENSION RESPIRATORY (INHALATION) at 07:20

## 2022-01-01 RX ADMIN — VANCOMYCIN HYDROCHLORIDE 1250 MG: 10 INJECTION, POWDER, LYOPHILIZED, FOR SOLUTION INTRAVENOUS at 20:24

## 2022-01-01 RX ADMIN — DOXYCYCLINE 100 MG: 100 INJECTION, POWDER, LYOPHILIZED, FOR SOLUTION INTRAVENOUS at 08:36

## 2022-01-01 RX ADMIN — IPRATROPIUM BROMIDE AND ALBUTEROL SULFATE 3 ML: .5; 3 SOLUTION RESPIRATORY (INHALATION) at 07:59

## 2022-01-01 RX ADMIN — SENNOSIDES AND DOCUSATE SODIUM 2 TABLET: 50; 8.6 TABLET ORAL at 21:38

## 2022-01-01 RX ADMIN — IPRATROPIUM BROMIDE AND ALBUTEROL SULFATE 3 ML: .5; 3 SOLUTION RESPIRATORY (INHALATION) at 07:20

## 2022-01-01 RX ADMIN — IPRATROPIUM BROMIDE AND ALBUTEROL SULFATE 3 ML: .5; 3 SOLUTION RESPIRATORY (INHALATION) at 11:46

## 2022-01-01 RX ADMIN — APIXABAN 5 MG: 5 TABLET, FILM COATED ORAL at 21:25

## 2022-01-01 RX ADMIN — Medication 1 CAPSULE: at 09:33

## 2022-01-01 RX ADMIN — FLECAINIDE ACETATE 50 MG: 50 TABLET ORAL at 09:17

## 2022-01-01 RX ADMIN — IPRATROPIUM BROMIDE AND ALBUTEROL SULFATE 3 ML: .5; 3 SOLUTION RESPIRATORY (INHALATION) at 16:14

## 2022-01-01 RX ADMIN — MINERAL OIL: 1000 LIQUID ORAL at 16:16

## 2022-01-01 RX ADMIN — MORPHINE SULFATE 1 MG: 2 INJECTION, SOLUTION INTRAMUSCULAR; INTRAVENOUS at 16:13

## 2022-01-01 RX ADMIN — Medication 10 ML: at 22:00

## 2022-01-01 RX ADMIN — BUSPIRONE HYDROCHLORIDE 7.5 MG: 5 TABLET ORAL at 21:25

## 2022-01-01 RX ADMIN — MINERAL OIL: 1000 LIQUID ORAL at 20:24

## 2022-01-01 RX ADMIN — Medication 10 ML: at 21:25

## 2022-01-01 RX ADMIN — BUSPIRONE HYDROCHLORIDE 7.5 MG: 5 TABLET ORAL at 08:47

## 2022-01-01 RX ADMIN — ESCITALOPRAM OXALATE 20 MG: 20 TABLET ORAL at 08:14

## 2022-01-01 RX ADMIN — BUSPIRONE HYDROCHLORIDE 7.5 MG: 5 TABLET ORAL at 09:44

## 2022-01-01 RX ADMIN — IPRATROPIUM BROMIDE AND ALBUTEROL SULFATE 3 ML: .5; 3 SOLUTION RESPIRATORY (INHALATION) at 11:36

## 2022-01-01 RX ADMIN — MINERAL OIL: 1000 LIQUID ORAL at 08:13

## 2022-01-01 RX ADMIN — BUSPIRONE HYDROCHLORIDE 7.5 MG: 5 TABLET ORAL at 17:00

## 2022-01-01 RX ADMIN — BUSPIRONE HYDROCHLORIDE 7.5 MG: 5 TABLET ORAL at 21:23

## 2022-01-01 RX ADMIN — BUSPIRONE HYDROCHLORIDE 7.5 MG: 5 TABLET ORAL at 17:39

## 2022-01-01 RX ADMIN — BUSPIRONE HYDROCHLORIDE 7.5 MG: 5 TABLET ORAL at 16:18

## 2022-01-01 RX ADMIN — BUDESONIDE 0.5 MG: 0.5 SUSPENSION RESPIRATORY (INHALATION) at 07:10

## 2022-01-01 RX ADMIN — FLECAINIDE ACETATE 50 MG: 50 TABLET ORAL at 20:42

## 2022-01-01 RX ADMIN — IPRATROPIUM BROMIDE AND ALBUTEROL SULFATE 3 ML: .5; 3 SOLUTION RESPIRATORY (INHALATION) at 07:10

## 2022-01-01 RX ADMIN — SODIUM CHLORIDE 4 ML: 7 NEBU SOLN,3 % NEBU at 19:48

## 2022-01-01 RX ADMIN — ESCITALOPRAM OXALATE 20 MG: 20 TABLET ORAL at 08:31

## 2022-01-01 RX ADMIN — BUDESONIDE 0.5 MG: 0.5 SUSPENSION RESPIRATORY (INHALATION) at 09:08

## 2022-01-01 RX ADMIN — LEVOFLOXACIN 750 MG: 750 INJECTION, SOLUTION INTRAVENOUS at 09:16

## 2022-01-01 RX ADMIN — DOXYCYCLINE 100 MG: 100 INJECTION, POWDER, LYOPHILIZED, FOR SOLUTION INTRAVENOUS at 08:33

## 2022-01-01 RX ADMIN — BUDESONIDE 0.5 MG: 0.5 SUSPENSION RESPIRATORY (INHALATION) at 20:48

## 2022-01-01 RX ADMIN — ACETAMINOPHEN 650 MG: 325 TABLET ORAL at 11:34

## 2022-01-01 RX ADMIN — SENNOSIDES AND DOCUSATE SODIUM 2 TABLET: 50; 8.6 TABLET ORAL at 21:05

## 2022-01-01 RX ADMIN — IPRATROPIUM BROMIDE AND ALBUTEROL SULFATE 3 ML: .5; 3 SOLUTION RESPIRATORY (INHALATION) at 15:32

## 2022-01-01 RX ADMIN — BUDESONIDE 0.5 MG: 0.5 SUSPENSION RESPIRATORY (INHALATION) at 06:49

## 2022-01-01 RX ADMIN — ESCITALOPRAM OXALATE 20 MG: 20 TABLET ORAL at 08:47

## 2022-01-01 RX ADMIN — APIXABAN 5 MG: 5 TABLET, FILM COATED ORAL at 21:37

## 2022-01-01 RX ADMIN — BUDESONIDE 0.5 MG: 0.5 SUSPENSION RESPIRATORY (INHALATION) at 21:19

## 2022-01-01 RX ADMIN — BUDESONIDE 0.5 MG: 0.5 SUSPENSION RESPIRATORY (INHALATION) at 20:07

## 2022-01-01 RX ADMIN — BUDESONIDE 0.5 MG: 0.5 SUSPENSION RESPIRATORY (INHALATION) at 07:06

## 2022-01-01 RX ADMIN — BUSPIRONE HYDROCHLORIDE 7.5 MG: 5 TABLET ORAL at 08:17

## 2022-01-01 RX ADMIN — BUDESONIDE 0.5 MG: 0.5 SUSPENSION RESPIRATORY (INHALATION) at 20:18

## 2022-01-01 RX ADMIN — MORPHINE SULFATE 1 MG: 2 INJECTION, SOLUTION INTRAMUSCULAR; INTRAVENOUS at 00:08

## 2022-01-01 RX ADMIN — SODIUM CHLORIDE 4 ML: 7 NEBU SOLN,3 % NEBU at 07:18

## 2022-01-01 RX ADMIN — BUDESONIDE 0.5 MG: 0.5 SUSPENSION RESPIRATORY (INHALATION) at 07:59

## 2022-01-01 RX ADMIN — FLECAINIDE ACETATE 50 MG: 50 TABLET ORAL at 21:37

## 2022-01-01 RX ADMIN — FLECAINIDE ACETATE 50 MG: 50 TABLET ORAL at 20:57

## 2022-01-01 RX ADMIN — Medication 10 ML: at 20:40

## 2022-01-01 RX ADMIN — BUSPIRONE HYDROCHLORIDE 7.5 MG: 5 TABLET ORAL at 20:57

## 2022-01-01 RX ADMIN — BUSPIRONE HYDROCHLORIDE 7.5 MG: 5 TABLET ORAL at 20:43

## 2022-01-01 RX ADMIN — Medication 10 ML: at 21:42

## 2022-01-01 RX ADMIN — Medication 1 PATCH: at 08:39

## 2022-01-01 RX ADMIN — APIXABAN 5 MG: 5 TABLET, FILM COATED ORAL at 09:15

## 2022-01-01 RX ADMIN — APIXABAN 5 MG: 5 TABLET, FILM COATED ORAL at 08:16

## 2022-01-01 RX ADMIN — DOXYCYCLINE 100 MG: 100 INJECTION, POWDER, LYOPHILIZED, FOR SOLUTION INTRAVENOUS at 19:35

## 2022-01-01 RX ADMIN — Medication 10 ML: at 21:23

## 2022-01-01 RX ADMIN — DIAZEPAM 2.5 MG: 5 INJECTION, SOLUTION INTRAMUSCULAR; INTRAVENOUS at 09:16

## 2022-01-01 RX ADMIN — SENNOSIDES AND DOCUSATE SODIUM 2 TABLET: 50; 8.6 TABLET ORAL at 20:39

## 2022-01-01 RX ADMIN — IPRATROPIUM BROMIDE AND ALBUTEROL SULFATE 3 ML: .5; 3 SOLUTION RESPIRATORY (INHALATION) at 19:13

## 2022-01-01 RX ADMIN — FLECAINIDE ACETATE 50 MG: 50 TABLET ORAL at 09:14

## 2022-01-01 RX ADMIN — BUDESONIDE 0.5 MG: 0.5 SUSPENSION RESPIRATORY (INHALATION) at 19:28

## 2022-01-01 RX ADMIN — ESCITALOPRAM OXALATE 20 MG: 20 TABLET ORAL at 09:33

## 2022-01-01 RX ADMIN — BUSPIRONE HYDROCHLORIDE 7.5 MG: 5 TABLET ORAL at 09:13

## 2022-01-01 RX ADMIN — Medication 1 PATCH: at 09:15

## 2022-01-01 RX ADMIN — Medication 10 ML: at 09:15

## 2022-01-01 RX ADMIN — MORPHINE SULFATE 2 MG: 2 INJECTION, SOLUTION INTRAMUSCULAR; INTRAVENOUS at 14:43

## 2022-01-01 RX ADMIN — PREDNISONE 40 MG: 20 TABLET ORAL at 08:32

## 2022-01-01 RX ADMIN — Medication 10 ML: at 09:34

## 2022-01-01 RX ADMIN — DOXYCYCLINE 100 MG: 100 INJECTION, POWDER, LYOPHILIZED, FOR SOLUTION INTRAVENOUS at 20:08

## 2022-01-01 RX ADMIN — MORPHINE SULFATE 1 MG: 2 INJECTION, SOLUTION INTRAMUSCULAR; INTRAVENOUS at 16:09

## 2022-01-01 RX ADMIN — IPRATROPIUM BROMIDE AND ALBUTEROL SULFATE 3 ML: .5; 3 SOLUTION RESPIRATORY (INHALATION) at 13:28

## 2022-01-01 RX ADMIN — MORPHINE SULFATE 1 MG: 2 INJECTION, SOLUTION INTRAMUSCULAR; INTRAVENOUS at 09:17

## 2022-01-01 RX ADMIN — Medication 1 CAPSULE: at 11:39

## 2022-01-01 RX ADMIN — IPRATROPIUM BROMIDE AND ALBUTEROL SULFATE 3 ML: .5; 3 SOLUTION RESPIRATORY (INHALATION) at 15:54

## 2022-01-01 RX ADMIN — IPRATROPIUM BROMIDE AND ALBUTEROL SULFATE 3 ML: .5; 3 SOLUTION RESPIRATORY (INHALATION) at 21:19

## 2022-01-01 RX ADMIN — KETOROLAC TROMETHAMINE 15 MG: 15 INJECTION, SOLUTION INTRAMUSCULAR; INTRAVENOUS at 20:34

## 2022-01-01 RX ADMIN — MINERAL OIL: 1000 LIQUID ORAL at 20:35

## 2022-01-01 RX ADMIN — DIAZEPAM 2.5 MG: 5 INJECTION, SOLUTION INTRAMUSCULAR; INTRAVENOUS at 00:54

## 2022-01-01 RX ADMIN — Medication 10 ML: at 21:05

## 2022-01-01 RX ADMIN — APIXABAN 5 MG: 5 TABLET, FILM COATED ORAL at 21:23

## 2022-01-01 RX ADMIN — SODIUM CHLORIDE 100 ML/HR: 9 INJECTION, SOLUTION INTRAVENOUS at 09:58

## 2022-01-01 RX ADMIN — BUDESONIDE 0.5 MG: 0.5 SUSPENSION RESPIRATORY (INHALATION) at 19:48

## 2022-01-01 RX ADMIN — BISACODYL 5 MG: 5 TABLET, COATED ORAL at 09:14

## 2022-01-01 RX ADMIN — SENNOSIDES AND DOCUSATE SODIUM 2 TABLET: 50; 8.6 TABLET ORAL at 09:33

## 2022-01-01 RX ADMIN — BUSPIRONE HYDROCHLORIDE 7.5 MG: 5 TABLET ORAL at 22:00

## 2022-01-01 RX ADMIN — MINERAL OIL: 1000 LIQUID ORAL at 12:53

## 2022-01-01 RX ADMIN — TAZOBACTAM SODIUM AND PIPERACILLIN SODIUM 3.38 G: 375; 3 INJECTION, SOLUTION INTRAVENOUS at 14:09

## 2022-01-01 RX ADMIN — BUDESONIDE 0.5 MG: 0.5 SUSPENSION RESPIRATORY (INHALATION) at 19:24

## 2022-01-01 RX ADMIN — FLECAINIDE ACETATE 50 MG: 50 TABLET ORAL at 21:42

## 2022-01-01 RX ADMIN — IPRATROPIUM BROMIDE AND ALBUTEROL SULFATE 3 ML: .5; 3 SOLUTION RESPIRATORY (INHALATION) at 12:36

## 2022-01-01 RX ADMIN — BUDESONIDE 0.5 MG: 0.5 SUSPENSION RESPIRATORY (INHALATION) at 20:21

## 2022-01-01 RX ADMIN — IPRATROPIUM BROMIDE AND ALBUTEROL SULFATE 3 ML: .5; 3 SOLUTION RESPIRATORY (INHALATION) at 21:03

## 2022-01-01 RX ADMIN — Medication 1 PATCH: at 08:37

## 2022-01-01 RX ADMIN — PREDNISONE 40 MG: 20 TABLET ORAL at 17:02

## 2022-01-01 RX ADMIN — MORPHINE SULFATE 1 MG: 2 INJECTION, SOLUTION INTRAMUSCULAR; INTRAVENOUS at 16:15

## 2022-01-01 RX ADMIN — PREDNISONE 40 MG: 20 TABLET ORAL at 08:14

## 2022-01-01 RX ADMIN — DIAZEPAM 2.5 MG: 5 INJECTION, SOLUTION INTRAMUSCULAR; INTRAVENOUS at 00:00

## 2022-01-01 RX ADMIN — ENOXAPARIN SODIUM 40 MG: 40 INJECTION SUBCUTANEOUS at 08:16

## 2022-01-01 RX ADMIN — BUDESONIDE 0.5 MG: 0.5 SUSPENSION RESPIRATORY (INHALATION) at 20:22

## 2022-01-01 RX ADMIN — LISINOPRIL 5 MG: 5 TABLET ORAL at 08:32

## 2022-01-01 RX ADMIN — SODIUM CHLORIDE 100 ML/HR: 9 INJECTION, SOLUTION INTRAVENOUS at 05:41

## 2022-01-01 RX ADMIN — PREDNISONE 40 MG: 20 TABLET ORAL at 08:37

## 2022-01-01 RX ADMIN — Medication 10 ML: at 20:57

## 2022-01-01 RX ADMIN — MINERAL OIL: 1000 LIQUID ORAL at 17:18

## 2022-01-01 RX ADMIN — IPRATROPIUM BROMIDE AND ALBUTEROL SULFATE 3 ML: .5; 3 SOLUTION RESPIRATORY (INHALATION) at 16:04

## 2022-01-01 RX ADMIN — SENNOSIDES AND DOCUSATE SODIUM 2 TABLET: 50; 8.6 TABLET ORAL at 09:15

## 2022-01-01 RX ADMIN — TAZOBACTAM SODIUM AND PIPERACILLIN SODIUM 3.38 G: 375; 3 INJECTION, SOLUTION INTRAVENOUS at 06:15

## 2022-01-01 RX ADMIN — TAZOBACTAM SODIUM AND PIPERACILLIN SODIUM 3.38 G: 375; 3 INJECTION, SOLUTION INTRAVENOUS at 06:30

## 2022-01-01 RX ADMIN — ESCITALOPRAM OXALATE 20 MG: 20 TABLET ORAL at 08:16

## 2022-01-01 RX ADMIN — FLECAINIDE ACETATE 50 MG: 50 TABLET ORAL at 08:58

## 2022-01-01 RX ADMIN — PREDNISONE 40 MG: 20 TABLET ORAL at 17:01

## 2022-01-01 RX ADMIN — IPRATROPIUM BROMIDE AND ALBUTEROL SULFATE 3 ML: .5; 3 SOLUTION RESPIRATORY (INHALATION) at 12:29

## 2022-01-01 RX ADMIN — IPRATROPIUM BROMIDE AND ALBUTEROL SULFATE 3 ML: .5; 3 SOLUTION RESPIRATORY (INHALATION) at 16:01

## 2022-01-01 RX ADMIN — Medication 10 ML: at 08:17

## 2022-01-01 RX ADMIN — Medication 1 PATCH: at 08:35

## 2022-01-01 RX ADMIN — LEVOFLOXACIN 750 MG: 750 TABLET, FILM COATED ORAL at 09:15

## 2022-01-01 RX ADMIN — MINERAL OIL: 1000 LIQUID ORAL at 20:34

## 2022-01-01 RX ADMIN — BUSPIRONE HYDROCHLORIDE 7.5 MG: 5 TABLET ORAL at 15:22

## 2022-01-01 RX ADMIN — SENNOSIDES AND DOCUSATE SODIUM 2 TABLET: 50; 8.6 TABLET ORAL at 08:14

## 2022-01-01 RX ADMIN — BUDESONIDE 0.5 MG: 0.5 SUSPENSION RESPIRATORY (INHALATION) at 07:27

## 2022-01-01 RX ADMIN — Medication 1 CAPSULE: at 08:16

## 2022-01-01 RX ADMIN — BUSPIRONE HYDROCHLORIDE 7.5 MG: 5 TABLET ORAL at 08:58

## 2022-01-01 RX ADMIN — BUDESONIDE 0.5 MG: 0.5 SUSPENSION RESPIRATORY (INHALATION) at 07:57

## 2022-01-01 RX ADMIN — FLECAINIDE ACETATE 50 MG: 50 TABLET ORAL at 21:05

## 2022-01-01 RX ADMIN — LISINOPRIL 5 MG: 5 TABLET ORAL at 09:33

## 2022-01-01 RX ADMIN — IPRATROPIUM BROMIDE AND ALBUTEROL SULFATE 3 ML: .5; 3 SOLUTION RESPIRATORY (INHALATION) at 13:21

## 2022-01-01 RX ADMIN — APIXABAN 5 MG: 5 TABLET, FILM COATED ORAL at 10:14

## 2022-01-01 RX ADMIN — APIXABAN 5 MG: 5 TABLET, FILM COATED ORAL at 08:33

## 2022-01-01 RX ADMIN — IPRATROPIUM BROMIDE AND ALBUTEROL SULFATE 3 ML: .5; 3 SOLUTION RESPIRATORY (INHALATION) at 12:54

## 2022-01-01 RX ADMIN — BUDESONIDE 0.5 MG: 0.5 SUSPENSION RESPIRATORY (INHALATION) at 19:13

## 2022-01-01 RX ADMIN — IPRATROPIUM BROMIDE AND ALBUTEROL SULFATE 3 ML: .5; 3 SOLUTION RESPIRATORY (INHALATION) at 15:43

## 2022-01-01 RX ADMIN — Medication 1 PATCH: at 20:27

## 2022-01-01 RX ADMIN — PREDNISONE 40 MG: 20 TABLET ORAL at 08:46

## 2022-01-01 RX ADMIN — BUSPIRONE HYDROCHLORIDE 7.5 MG: 5 TABLET ORAL at 16:40

## 2022-01-01 RX ADMIN — LISINOPRIL 5 MG: 5 TABLET ORAL at 09:15

## 2022-01-01 RX ADMIN — Medication 10 ML: at 20:43

## 2022-01-01 RX ADMIN — IPRATROPIUM BROMIDE AND ALBUTEROL SULFATE 3 ML: .5; 3 SOLUTION RESPIRATORY (INHALATION) at 20:22

## 2022-01-01 RX ADMIN — TAZOBACTAM SODIUM AND PIPERACILLIN SODIUM 3.38 G: 375; 3 INJECTION, SOLUTION INTRAVENOUS at 05:45

## 2022-01-01 RX ADMIN — Medication 1 PATCH: at 08:18

## 2022-01-01 RX ADMIN — SODIUM CHLORIDE 100 ML/HR: 9 INJECTION, SOLUTION INTRAVENOUS at 04:06

## 2022-01-01 RX ADMIN — IPRATROPIUM BROMIDE AND ALBUTEROL SULFATE 3 ML: .5; 3 SOLUTION RESPIRATORY (INHALATION) at 19:29

## 2022-01-01 RX ADMIN — IPRATROPIUM BROMIDE AND ALBUTEROL SULFATE 3 ML: .5; 3 SOLUTION RESPIRATORY (INHALATION) at 07:07

## 2022-01-01 RX ADMIN — IPRATROPIUM BROMIDE AND ALBUTEROL SULFATE 3 ML: .5; 3 SOLUTION RESPIRATORY (INHALATION) at 12:01

## 2022-01-01 RX ADMIN — PREDNISONE 40 MG: 20 TABLET ORAL at 09:15

## 2022-01-01 RX ADMIN — FLECAINIDE ACETATE 50 MG: 50 TABLET ORAL at 08:16

## 2022-01-01 RX ADMIN — KETOROLAC TROMETHAMINE 15 MG: 15 INJECTION, SOLUTION INTRAMUSCULAR; INTRAVENOUS at 13:39

## 2022-01-01 RX ADMIN — IPRATROPIUM BROMIDE AND ALBUTEROL SULFATE 3 ML: .5; 3 SOLUTION RESPIRATORY (INHALATION) at 19:45

## 2022-01-01 RX ADMIN — IPRATROPIUM BROMIDE AND ALBUTEROL SULFATE 3 ML: .5; 3 SOLUTION RESPIRATORY (INHALATION) at 12:08

## 2022-01-01 RX ADMIN — TAZOBACTAM SODIUM AND PIPERACILLIN SODIUM 3.38 G: 375; 3 INJECTION, SOLUTION INTRAVENOUS at 22:20

## 2022-01-01 RX ADMIN — DIAZEPAM 2.5 MG: 5 INJECTION, SOLUTION INTRAMUSCULAR; INTRAVENOUS at 23:49

## 2022-01-01 RX ADMIN — BUDESONIDE 0.5 MG: 0.5 SUSPENSION RESPIRATORY (INHALATION) at 07:07

## 2022-01-01 RX ADMIN — DOXYCYCLINE 100 MG: 100 INJECTION, POWDER, LYOPHILIZED, FOR SOLUTION INTRAVENOUS at 20:24

## 2022-01-01 RX ADMIN — FLECAINIDE ACETATE 50 MG: 50 TABLET ORAL at 21:25

## 2022-01-01 RX ADMIN — ESCITALOPRAM OXALATE 20 MG: 20 TABLET ORAL at 09:15

## 2022-01-01 RX ADMIN — IPRATROPIUM BROMIDE AND ALBUTEROL SULFATE 3 ML: .5; 3 SOLUTION RESPIRATORY (INHALATION) at 19:48

## 2022-01-01 RX ADMIN — APIXABAN 5 MG: 5 TABLET, FILM COATED ORAL at 09:14

## 2022-07-24 PROBLEM — R91.8 MASS OF RIGHT LUNG: Status: ACTIVE | Noted: 2022-01-01

## 2022-07-25 PROBLEM — J18.9 PNEUMONIA OF LEFT UPPER LOBE DUE TO INFECTIOUS ORGANISM: Status: ACTIVE | Noted: 2022-01-01

## 2022-07-25 PROBLEM — E43 SEVERE MALNUTRITION (HCC): Status: ACTIVE | Noted: 2022-01-01

## 2022-08-01 NOTE — PROGRESS NOTES
NN spoke with pt at BS.  Pt alert and able to answer questions appropriately. Pt O2 sat 91% on  55% HFNC currently, home O2 use 3.5-4 L.  Deep breathing exercises encouraged. No new concerns or questions voiced at this time.  NN will continue to follow as needed.       Per current GOLD Standards, please consider: No LAMA/LABA/ICS in place, Outpatient PFT, Rehab as appropriate, Palliative Care consult, NRT at OR, Annual LDCT per current screening guidelines (age 50-80 years old, smoking history of 20 pack years or more or has quit within past 15 years)

## 2022-08-01 NOTE — THERAPY TREATMENT NOTE
Patient Name: Iliana SÁNCHEZ Kwabena  : 1945    MRN: [id number]                              Today's Date: 2022       Admit Date: 2022    Visit Dx: No diagnosis found.  Patient Active Problem List   Diagnosis   • Chronic obstructive pulmonary disease with acute exacerbation (HCC)   • Acute on chronic respiratory failure with hypoxia and hypercapnia (HCC)   • Tobacco abuse   • Mass of right lung -suspected based on chest x-ray.   • Severe malnutrition (HCC)   • Pneumonia of left upper lobe due to infectious organism     Past Medical History:   Diagnosis Date   • Afib (HCC)    • Chest pain    • Chronic hypercapnic respiratory failure (HCC)    • COPD (chronic obstructive pulmonary disease) (HCC)    • Depression    • Emphysema (subcutaneous) (surgical) resulting from a procedure    • Hypertension    • Spondylosis    • Tobacco abuse      Past Surgical History:   Procedure Laterality Date   • COLON SURGERY        General Information     Row Name 22 1030          OT Time and Intention    Document Type therapy note (daily note)  -HK     Mode of Treatment occupational therapy  -     Row Name 22 1030          General Information    Patient Profile Reviewed yes  -HK     Existing Precautions/Restrictions fall;oxygen therapy device and L/min;other (see comments)  hi flow O2  -HK     Barriers to Rehab medically complex;hearing deficit  -HK     Row Name 22 1030          Cognition    Orientation Status (Cognition) oriented x 3  -HK     Row Name 22 1030          Safety Issues, Functional Mobility    Safety Issues Affecting Function (Mobility) safety precautions follow-through/compliance;safety precaution awareness;insight into deficits/self-awareness;awareness of need for assistance;judgment;sequencing abilities  -HK     Impairments Affecting Function (Mobility) balance;endurance/activity tolerance;strength;shortness of breath;postural/trunk control  -HK     Cognitive Impairments, Mobility  Safety/Performance attention;awareness, need for assistance;insight into deficits/self-awareness;problem-solving/reasoning;judgment;safety precaution follow-through;safety precaution awareness;sequencing abilities  -     Comment, Safety Issues/Impairments (Mobility) increased time and effort for all transfers.  -           User Key  (r) = Recorded By, (t) = Taken By, (c) = Cosigned By    Initials Name Provider Type     Misty Vasquez, OT Occupational Therapist                 Mobility/ADL's     Row Name 08/01/22 1032          Bed Mobility    Bed Mobility scooting/bridging;supine-sit  -     Scooting/Bridging Harris (Bed Mobility) supervision;verbal cues  -     Supine-Sit Harris (Bed Mobility) supervision;verbal cues  -     Bed Mobility, Safety Issues impaired trunk control for bed mobility;decreased use of arms for pushing/pulling;decreased use of legs for bridging/pushing  -     Assistive Device (Bed Mobility) head of bed elevated;bed rails  -     Comment, (Bed Mobility) Verbal cues for intiation. Extra time/effort for all movement.  -     Row Name 08/01/22 1032          Transfers    Transfers sit-stand transfer;bed-chair transfer  -     Bed-Chair Harris (Transfers) contact guard;verbal cues  -     Assistive Device (Bed-Chair Transfers) walker, front-wheeled  -     Sit-Stand Harris (Transfers) 1 person assist;verbal cues;contact guard  -     Stand-Sit Harris (Transfers) minimum assist (75% patient effort)  -     Row Name 08/01/22 1032          Sit-Stand Transfer    Assistive Device (Sit-Stand Transfers) walker, front-wheeled  -     Row Name 08/01/22 1032          Stand-Sit Transfer    Assistive Device (Stand-Sit Transfers) walker, front-wheeled  -     Comment, (Stand-Sit Transfer) Pt completed sit to stand from EOB x1 and from chair x1 for horace care.  -     Row Name 08/01/22 1032          Functional Mobility    Functional Mobility- Ind. Level not tested   -HK     Row Name 08/01/22 1032          Activities of Daily Living    BADL Assessment/Intervention toileting  -     Row Name 08/01/22 1032          Toileting Assessment/Training    [place] Level (Toileting) perform perineal hygiene;adjust/manage clothing;dependent (less than 25% patient effort)  -HK     Position (Toileting) supported standing  -HK     Comment, (Toileting) Pt with noted bowel incontinence upon standing. Dependent for all horace care.  -HK           User Key  (r) = Recorded By, (t) = Taken By, (c) = Cosigned By    Initials Name Provider Type    Misty Johnson, OT Occupational Therapist               Obj/Interventions     Row Name 08/01/22 1038          Sensory Assessment (Somatosensory)    Sensory Assessment (Somatosensory) sensation intact  -     Row Name 08/01/22 1038          Vision Assessment/Intervention    Visual Impairment/Limitations WFL  -     Row Name 08/01/22 1038          Balance    Balance Assessment sitting static balance;sitting dynamic balance;standing static balance;standing dynamic balance  -HK     Static Sitting Balance supervision  -HK     Dynamic Sitting Balance supervision  -HK     Position, Sitting Balance unsupported;sitting edge of bed  -HK     Static Standing Balance contact guard  -HK     Dynamic Standing Balance minimal assist  -HK     Position/Device Used, Standing Balance supported;walker, rolling  -HK           User Key  (r) = Recorded By, (t) = Taken By, (c) = Cosigned By    Initials Name Provider Type    Misty Johnson, OT Occupational Therapist               Goals/Plan    No documentation.                Clinical Impression     Row Name 08/01/22 1038          Pain Assessment    Pretreatment Pain Rating 0/10 - no pain  -HK     Posttreatment Pain Rating 0/10 - no pain  -HK     Row Name 08/01/22 1038          Plan of Care Review    Plan of Care Reviewed With patient  -HK     Progress improving  -HK     Outcome Evaluation Pt advanced to EOB with supervision  and required minAx1 for sit to stand. Pt took steps from bed to chair with minAx1 and RW. Pt with bowel incontinence requiring dependence for all horace care. Extra time/effort as well as rest breaks required. Recommend d/c to IPR.  -     Row Name 08/01/22 1038          Therapy Assessment/Plan (OT)    Rehab Potential (OT) good, to achieve stated therapy goals  -     Criteria for Skilled Therapeutic Interventions Met (OT) yes;skilled treatment is necessary  -     Therapy Frequency (OT) daily  -     Row Name 08/01/22 1038          Therapy Plan Review/Discharge Plan (OT)    Anticipated Discharge Disposition (OT) inpatient rehabilitation facility  -     Row Name 08/01/22 1038          Vital Signs    Pre Systolic BP Rehab --  RN cleared for tx; VSS  -HK     Pre SpO2 (%) 97  -HK     O2 Delivery Pre Treatment hi-flow  -HK     Intra SpO2 (%) 93  -HK     O2 Delivery Intra Treatment hi-flow  -HK     Post SpO2 (%) 98  -HK     O2 Delivery Post Treatment hi-flow  -HK     Row Name 08/01/22 1038          Positioning and Restraints    Pre-Treatment Position in bed  -HK     Post Treatment Position chair  -HK     In Chair notified nsg;reclined;call light within reach;encouraged to call for assist;exit alarm on;waffle cushion  -           User Key  (r) = Recorded By, (t) = Taken By, (c) = Cosigned By    Initials Name Provider Type     Misty Vasquez, OT Occupational Therapist               Outcome Measures     Row Name 08/01/22 1040          How much help from another is currently needed...    Putting on and taking off regular lower body clothing? 3  -HK     Bathing (including washing, rinsing, and drying) 2  -HK     Toileting (which includes using toilet bed pan or urinal) 3  -HK     Putting on and taking off regular upper body clothing 3  -HK     Taking care of personal grooming (such as brushing teeth) 3  -HK     Eating meals 3  -HK     AM-PAC 6 Clicks Score (OT) 17  -     Row Name 08/01/22 0800          How much help  from another person do you currently need...    Turning from your back to your side while in flat bed without using bedrails? 3  -VL     Moving from lying on back to sitting on the side of a flat bed without bedrails? 3  -VL     Moving to and from a bed to a chair (including a wheelchair)? 3  -VL     Standing up from a chair using your arms (e.g., wheelchair, bedside chair)? 3  -VL     Climbing 3-5 steps with a railing? 1  -VL     To walk in hospital room? 2  -VL     AM-PAC 6 Clicks Score (PT) 15  -VL     Highest level of mobility 4 --> Transferred to chair/commode  -VL     Row Name 08/01/22 1040          Functional Assessment    Outcome Measure Options AM-PAC 6 Clicks Daily Activity (OT)  -           User Key  (r) = Recorded By, (t) = Taken By, (c) = Cosigned By    Initials Name Provider Type    VL Sharon Carlson RN Registered Nurse     Misty Vasquez, OT Occupational Therapist                Occupational Therapy Education                 Title: PT OT SLP Therapies (Done)     Topic: Occupational Therapy (Done)     Point: ADL training (Done)     Description:   Instruct learner(s) on proper safety adaptation and remediation techniques during self care or transfers.   Instruct in proper use of assistive devices.              Learning Progress Summary           Patient Acceptance, TB,D,E, VU,NR by  at 8/1/2022 1041    Acceptance, E, VU by AN at 7/27/2022 1414    Acceptance, E, VU by AN at 7/26/2022 1550                   Point: Home exercise program (Done)     Description:   Instruct learner(s) on appropriate technique for monitoring, assisting and/or progressing therapeutic exercises/activities.              Learning Progress Summary           Patient Acceptance, E, VU by AN at 7/26/2022 1550                   Point: Precautions (Done)     Description:   Instruct learner(s) on prescribed precautions during self-care and functional transfers.              Learning Progress Summary           Patient Acceptance,  TB,D,E, VU,NR by  at 8/1/2022 1041    Acceptance, E, VU by AN at 7/27/2022 1414    Acceptance, E, VU by AN at 7/26/2022 1550                   Point: Body mechanics (Done)     Description:   Instruct learner(s) on proper positioning and spine alignment during self-care, functional mobility activities and/or exercises.              Learning Progress Summary           Patient Acceptance, TB,D,E, VU,NR by  at 8/1/2022 1041    Acceptance, E, VU by AN at 7/27/2022 1414    Acceptance, E, VU by AN at 7/26/2022 1550                               User Key     Initials Effective Dates Name Provider Type [facility] 06/16/21 -  Misty Vasquez OT Occupational Therapist OT     09/21/21 -  Sophia Bernard OT Occupational Therapist OT              OT Recommendation and Plan  Therapy Frequency (OT): daily  Plan of Care Review  Plan of Care Reviewed With: patient  Progress: improving  Outcome Evaluation: Pt advanced to EOB with supervision and required minAx1 for sit to stand. Pt took steps from bed to chair with minAx1 and RW. Pt with bowel incontinence requiring dependence for all horace care. Extra time/effort as well as rest breaks required. Recommend d/c to IPR.     Time Calculation:    Time Calculation- OT     Row Name 08/01/22 0920             Time Calculation- OT    OT Start Time 0920  -HK      OT Received On 08/01/22  -HK              Timed Charges    59402 - OT Self Care/Mgmt Minutes 25  -HK              Total Minutes    Timed Charges Total Minutes 25  -HK       Total Minutes 25  -HK            User Key  (r) = Recorded By, (t) = Taken By, (c) = Cosigned By    Initials Name Provider Type     Misty Vasquez OT Occupational Therapist              Therapy Charges for Today     Code Description Service Date Service Provider Modifiers Qty    10734792373 HC OT SELF CARE/MGMT/TRAIN EA 15 MIN 8/1/2022 Misty Vasquez OT GO 2               Misty Vasquez OT  8/1/2022

## 2022-08-01 NOTE — H&P (VIEW-ONLY)
Keysha Decker MD  Consulting physician: Tadeo    No chief complaint on file.      Reason for consult: Sx mgmnt    HPI: Patient is a 76-year-old female brought to hospital due to increasing dyspnea.  The patient has a history of COPD, stating that she uses about 3 to 4 L of oxygen per nasal cannula at home.  Patient also goes on to report that she lives by herself.  Patient states that since coming hospital her breathing has not gotten any better even though she has been placed on high flow oxygen.    Pain assesment: Denies    Dyspnea: Positive    N/V: Denies    PPS: 30      Past Medical History:   Diagnosis Date   • Afib (HCC)    • Chest pain    • Chronic hypercapnic respiratory failure (HCC)    • COPD (chronic obstructive pulmonary disease) (HCC)    • Depression    • Emphysema (subcutaneous) (surgical) resulting from a procedure    • Hypertension    • Spondylosis    • Tobacco abuse      Past Surgical History:   Procedure Laterality Date   • COLON SURGERY       Current Code Status     Date Active Code Status Order ID Comments User Context       7/24/2022 1617 No CPR (Do Not Attempt to Resuscitate) [id number]  Cee Tam, NICOLE Inpatient     Advance Care Planning Activity      Questions for Current Code Status     Question Answer    Code Status (Patient has no pulse and is not breathing) No CPR (Do Not Attempt to Resuscitate)    Medical Interventions (Patient has pulse or is breathing) Limited Support    Medical Intervention Limits: NO intubation (DNI)        Current Facility-Administered Medications   Medication Dose Route Frequency Provider Last Rate Last Admin   • acetaminophen (TYLENOL) tablet 650 mg  650 mg Oral Q4H PRN Ludwin Loera MD   650 mg at 07/26/22 0616    Or   • acetaminophen (TYLENOL) suppository 650 mg  650 mg Rectal Q4H PRN Ludwin Loera MD       • apixaban (ELIQUIS) tablet 5 mg  5 mg Oral Q12H Ludwin Loera MD   5 mg at 08/01/22 0816   • sennosides-docusate (PERICOLACE)  8.6-50 MG per tablet 2 tablet  2 tablet Oral BID Galileo Jacques MD   2 tablet at 08/01/22 0815    And   • polyethylene glycol (MIRALAX) packet 17 g  17 g Oral Daily PRN Galileo Jacques MD        And   • bisacodyl (DULCOLAX) EC tablet 5 mg  5 mg Oral Daily PRN Galileo Jacques MD   5 mg at 07/30/22 0914    And   • bisacodyl (DULCOLAX) suppository 10 mg  10 mg Rectal Daily PRN Galileo Jacques MD       • budesonide (PULMICORT) nebulizer solution 0.5 mg  0.5 mg Nebulization BID - RT Ludwin Loera MD   0.5 mg at 08/01/22 0649   • busPIRone (BUSPAR) tablet 7.5 mg  7.5 mg Oral TID Ludwin Loera MD   7.5 mg at 08/01/22 0816   • escitalopram (LEXAPRO) tablet 20 mg  20 mg Oral Daily Ludwin Loera MD   20 mg at 08/01/22 0816   • flecainide (TAMBOCOR) tablet 50 mg  50 mg Oral Q12H Ludwin Loera MD   50 mg at 08/01/22 0816   • ipratropium-albuterol (DUO-NEB) nebulizer solution 3 mL  3 mL Nebulization 4x Daily - RT Ludwin Loera MD   3 mL at 08/01/22 0649   • lactobacillus acidophilus (RISAQUAD) capsule 1 capsule  1 capsule Oral Daily Galileo Jacques MD   1 capsule at 08/01/22 0816   • levoFLOXacin (LEVAQUIN) tablet 750 mg  750 mg Oral Daily Galileo Jacques MD   750 mg at 08/01/22 0815   • lisinopril (PRINIVIL,ZESTRIL) tablet 5 mg  5 mg Oral Daily Ludwin Loera MD   5 mg at 08/01/22 0816   • Magnesium Sulfate 2 gram Bolus, followed by 8 gram infusion (total Mg dose 10 grams)- Mg less than or equal to 1mg/dL  2 g Intravenous PRN Ludwin Loera MD        Or   • Magnesium Sulfate 2 gram / 50mL Infusion (GIVE X 3 BAGS TO EQUAL 6GM TOTAL DOSE) - Mg 1.1 - 1.5 mg/dl  2 g Intravenous PRN Ludwin Loera MD        Or   • Magnesium Sulfate 4 gram infusion- Mg 1.6-1.9 mg/dL  4 g Intravenous PRN Ludwin Loera MD       • morphine injection 2 mg  2 mg Intravenous Q1H PRN Richard Palumbo, DO       • nicotine (NICODERM CQ) 21 MG/24HR patch 1 patch  1 patch Transdermal Q24H Ludwin Loera MD    1 patch at 08/01/22 0818   • Pharmacy Consult - MTM   Does not apply Daily Ludwin Loera MD       • potassium & sodium phosphates (PHOS-NAK) 280-160-250 MG packet - for Phosphorus less than 1.25 mg/dL  2 packet Oral Q6H PRN Galileo Jacques MD        Or   • potassium & sodium phosphates (PHOS-NAK) 280-160-250 MG packet - for Phosphorus 1.25 - 2.5 mg/dL  2 packet Oral Q6H PRN Galileo Jacques MD       • potassium chloride (KLOR-CON) packet 40 mEq  40 mEq Oral PRN Galileo Jacques MD       • potassium chloride (MICRO-K) CR capsule 40 mEq  40 mEq Oral PRN Galileo Jacques MD       • predniSONE (DELTASONE) tablet 40 mg  40 mg Oral Daily Galileo Jacques MD   40 mg at 08/01/22 0815   • sodium chloride 0.9 % flush 10 mL  10 mL Intravenous Q12H Ludwin Loera MD   10 mL at 08/01/22 0818   • sodium chloride 0.9 % flush 10 mL  10 mL Intravenous PRN Ludwin Loera MD       • sodium chloride 7 % nebulizer solution nebulizer solution 4 mL  4 mL Nebulization BID - RT Reid Piedra MD            •  acetaminophen **OR** acetaminophen  •  senna-docusate sodium **AND** polyethylene glycol **AND** bisacodyl **AND** bisacodyl  •  magnesium sulfate **OR** magnesium sulfate **OR** magnesium sulfate  •  Morphine  •  potassium & sodium phosphates **OR** potassium & sodium phosphates  •  potassium chloride  •  potassium chloride  •  sodium chloride  Allergies   Allergen Reactions   • Moxifloxacin Unknown - High Severity     Per OSH records   • Sulfadiazine Unknown - High Severity     Per OSH records     History reviewed. No pertinent family history.  Social History     Socioeconomic History   • Marital status:    Tobacco Use   • Smoking status: Current Every Day Smoker   • Smokeless tobacco: Never Used   Substance and Sexual Activity   • Alcohol use: No   • Drug use: No     Review of Systems -all others reviewed and found negative that mentioned in the HPI      /80 (BP Location: Left arm, Patient  "Position: Lying)   Pulse 68   Temp 98.1 °F (36.7 °C) (Axillary)   Resp 16   Ht 167.6 cm (66\")   Wt 57.4 kg (126 lb 8.7 oz)   SpO2 91%   BMI 20.42 kg/m²     Intake/Output Summary (Last 24 hours) at 8/1/2022 1406  Last data filed at 8/1/2022 1325  Gross per 24 hour   Intake 840 ml   Output 900 ml   Net -60 ml     Physical Exam:      General Appearance:    Alert, cooperative, in no acute distress   Head:    Normocephalic, without obvious abnormality, atraumatic   Eyes:            Lids and lashes normal, conjunctivae and sclerae normal, no   icterus, no pallor, corneas clear, PERRLA   Ears:    Ears appear intact with no abnormalities noted   Throat:   No oral lesions, no thrush, oral mucosa moist   Neck:   No adenopathy, supple, trachea midline, no thyromegaly, no     carotid bruit, no JVD   Back:     No kyphosis present, no scoliosis present, no skin lesions,       erythema or scars, no tenderness to percussion or                   palpation,   range of motion normal   Lungs:     Clear to auscultation,respirations regular, even and                   unlabored    Heart:    Regular rhythm and normal rate, normal S1 and S2, no            murmur, no gallop, no rub, no click   Breast Exam:    Deferred   Abdomen:     Normal bowel sounds, no masses, no organomegaly, soft        non-tender, non-distended, no guarding, no rebound                 tenderness   Genitalia:    Deferred   Extremities:   Moves all extremities well, no edema, no cyanosis, no              redness   Pulses:   Pulses palpable and equal bilaterally   Skin:   No bleeding, bruising or rash   Lymph nodes:   No palpable adenopathy   Neurologic:   Cranial nerves 2 - 12 grossly intact, sensation intact, DTR        present and equal bilaterally       Results from last 7 days   Lab Units 08/01/22  0843   WBC 10*3/mm3 13.76*   HEMOGLOBIN g/dL 9.2*   HEMATOCRIT % 29.0*   PLATELETS 10*3/mm3 229     Results from last 7 days   Lab Units 08/01/22  0843   SODIUM " mmol/L 140   POTASSIUM mmol/L 4.1   CHLORIDE mmol/L 94*   CO2 mmol/L 48.0*   BUN mg/dL 24*   CREATININE mg/dL 0.49*   CALCIUM mg/dL 8.9   GLUCOSE mg/dL 87     Results from last 7 days   Lab Units 08/01/22  0843   SODIUM mmol/L 140   POTASSIUM mmol/L 4.1   CHLORIDE mmol/L 94*   CO2 mmol/L 48.0*   BUN mg/dL 24*   CREATININE mg/dL 0.49*   GLUCOSE mg/dL 87   CALCIUM mg/dL 8.9     Imaging Results (Last 72 Hours)     Procedure Component Value Units Date/Time    XR Chest 1 View [[id number]] Collected: 08/01/22 0747     Updated: 08/01/22 0753    Narrative:      DATE OF EXAM: 8/1/2022 5:01 AM     PROCEDURE: XR CHEST 1 VW-     INDICATIONS: pneumonia, COPD     COMPARISON: 07/24/2022, CT chest 07/25/2022     TECHNIQUE: Single radiographic AP view of the chest was obtained.     FINDINGS:  There are bibasilar effusions. There is more of a bandlike density  extending out from the right infrahilar area that could relate to some  atelectasis. There are emphysematous changes. There are interstitial  changes throughout the lungs that in part could reflect some chronic  change. This is more pronounced in left upper lobe which could relate to  an infiltrate based on prior CT of the chest.        Impression:      1.  Worsening appearance to the basilar areas which could relate to  increasing effusions and atelectasis.  2.  Interstitial changes within the lungs more pronounced left upper  lobe that appears to reflect pneumonia based on CT chest  3.  Bandlike area of density in the right middle lobe area which could  reflect atelectasis unchanged.     This report was finalized on 8/1/2022 7:50 AM by Mynor Penn MD.           Impression: COPD  Dyspnea  Lung mass  Debility  [facility]    Plan: In respect to goals of care the patient is a DNR which I agree with.  I did not have any further goals of care discussions at this point time with the patient beyond the fact that she is hoping to go to rehab.  Did mention to her that before she can go to  rehab she would have to be off the high flow oxygen.  I will go ahead and add some as needed morphine which can be used to help with the patient's dyspnea.  We will see how the patient does over the next 24 to 48 hours in respect to her oxygen demand and have further discussions.      Richard Palumbo,   08/01/22  14:06 EDT

## 2022-08-01 NOTE — PROGRESS NOTES
"INTENSIVIST / PULMONARY FOLLOW UP NOTE     Hospital:  LOS: 8 days   Ms. Iliana Yuan, 76 y.o. female is followed for:     Chronic obstructive pulmonary disease with acute exacerbation (HCC)    Acute on chronic respiratory failure with hypoxia and hypercapnia (HCC)    Tobacco abuse    Mass of right lung -suspected based on chest x-ray.    Severe malnutrition (HCC)    Pneumonia of left upper lobe due to infectious organism          SUBJECTIVE   Breathing \"about the same\"    The patient's relevant past medical, surgical, family, and social history were reviewed    Allergies and medications were reviewed    ROS:  Per subjective, all other systems were reviewed and were negative        OBJECTIVE     Vital Sign Min/Max for last 24 hours:  Temp  Min: 97 °F (36.1 °C)  Max: 98.3 °F (36.8 °C)   BP  Min: 107/80  Max: 159/73   Pulse  Min: 61  Max: 75   Resp  Min: 15  Max: 20   SpO2  Min: 87 %  Max: 97 %   No data recorded     Physical Exam:  General Appearance:  No acute distress  Eyes:  No scleral icterus or pallor, pupils normal  Ears, Nose, Mouth, Throat:  Atraumatic, oropharynx clear  Neck:  Trachea midline, thyroid normal  Respiratory:  diminished to auscultation bilaterally, normal effort  Cardiovascular:  Regular rate and rhythm, no murmurs, no peripheral edema  Gastrointestinal:  Soft, non-tender, non-distended, no hepatosplenomegaly  Skin:  Normal temperature, no rash  Psychiatric:  No agitation  Neuro:  No new focal neurologic deficits observed    Telemetry:              Hemodynamics:   CVP:     PAP:     PAOP:     CO:     CI:     SVI:     SVR:       SpO2: 95 % SpO2  Min: 87 %  Max: 97 %   Device:      Flow Rate:   No data recorded     Mechanical Ventilator Settings:                                         Intake/Ouptut 24 hrs (7:00AM - 6:59 AM)  Intake & Output (last 3 days)       07/29 0701 07/30 0700 07/30 0701 07/31 0700 07/31 0701 08/01 0700 08/01 0701 08/02 0700    P.O. 960 960  600    I.V. (mL/kg)        IV " Piggyback        Total Intake(mL/kg) 960 (16.7) 960 (16.7)  600 (10.5)    Urine (mL/kg/hr) 650 (0.5) 1700 (1.2) 900 (0.7)     Stool  0 0     Total Output 650 1700 900     Net +310 -740 -900 +600            Urine Unmeasured Occurrence  1 x      Stool Unmeasured Occurrence  2 x 1 x           Lines, Drains & Airways     Active LDAs     Name Placement date Placement time Site Days    Peripheral IV 07/28/22 1749 Anterior;Left Forearm 07/28/22 1749  Forearm  3    External Urinary Catheter 07/24/22 1749  --  7                Hematology:  Results from last 7 days   Lab Units 08/01/22  0843 07/31/22  0648 07/30/22  0818 07/29/22  1153 07/28/22  0606 07/27/22  0439 07/26/22  0412   WBC 10*3/mm3 13.76* 10.00 11.01* 14.93* 16.41* 17.46* 17.06*   HEMOGLOBIN g/dL 9.2* 8.5* 8.8* 8.2* 8.9* 8.6* 8.6*   HEMATOCRIT % 29.0* 25.5* 27.2* 25.8* 28.5* 26.8* 26.3*   PLATELETS 10*3/mm3 229 197 208 186 184 167 151     Electrolytes, Magnesium and Phosphorus:  Results from last 7 days   Lab Units 08/01/22  0843 07/31/22  0648 07/30/22  0818 07/29/22  1153 07/28/22  0606 07/27/22  0439 07/26/22  0412   SODIUM mmol/L 140 137 137 138 137 139 132*   CHLORIDE mmol/L 94* 94* 93* 95* 91* 92* 87*   POTASSIUM mmol/L 4.1 4.0 3.9 4.4 4.6 4.6 4.7   CO2 mmol/L 48.0* 44.0* 41.0* 41.0* 44.0* 48.0* 46.0*   MAGNESIUM mg/dL  --   --   --   --   --   --  1.8   PHOSPHORUS mg/dL  --   --   --   --   --   --  3.4     Renal:  Results from last 7 days   Lab Units 08/01/22  0843 07/31/22  0648 07/30/22  0818 07/29/22  1153 07/28/22  0606 07/27/22  0439 07/26/22  0412   CREATININE mg/dL 0.49* 0.44* 0.54* 0.60 0.60 0.59 0.64   BUN mg/dL 24* 30* 26* 29* 27* 22 21     Estimated Creatinine Clearance: 87.1 mL/min (A) (by C-G formula based on SCr of 0.49 mg/dL (L)).  Hepatic:      Arterial Blood Gases:  Results from last 7 days   Lab Units 07/28/22  1015 07/26/22  1304   PH, ARTERIAL pH units 7.372 7.433   PCO2, ARTERIAL mm Hg 88.8* 69.0*   PO2 ART mm Hg 82.2* 97.7   FIO2 %  60 60             Lab Results   Component Value Date    LACTATE 1.2 07/24/2022       Relevant imaging studies and labs from 08/01/22 were reviewed    Medications (drips):       apixaban, 5 mg, Oral, Q12H  budesonide, 0.5 mg, Nebulization, BID - RT  busPIRone, 7.5 mg, Oral, TID  escitalopram, 20 mg, Oral, Daily  flecainide, 50 mg, Oral, Q12H  ipratropium-albuterol, 3 mL, Nebulization, 4x Daily - RT  lactobacillus acidophilus, 1 capsule, Oral, Daily  levoFLOXacin, 750 mg, Oral, Daily  lisinopril, 5 mg, Oral, Daily  nicotine, 1 patch, Transdermal, Q24H  pharmacy consult - MTM, , Does not apply, Daily  predniSONE, 40 mg, Oral, Daily  senna-docusate sodium, 2 tablet, Oral, BID  sodium chloride, 10 mL, Intravenous, Q12H        •  acetaminophen **OR** acetaminophen  •  senna-docusate sodium **AND** polyethylene glycol **AND** bisacodyl **AND** bisacodyl  •  magnesium sulfate **OR** magnesium sulfate **OR** magnesium sulfate  •  potassium & sodium phosphates **OR** potassium & sodium phosphates  •  potassium chloride  •  potassium chloride  •  sodium chloride    Assessment & Plan   IMPRESSION / PLAN     Inpatient Problem List:  76 y.o.female:  Active Hospital Problems    Diagnosis    • **Chronic obstructive pulmonary disease with acute exacerbation (HCC)    • Severe malnutrition (HCC)    • Pneumonia of left upper lobe due to infectious organism    • Mass of right lung -suspected based on chest x-ray.    • Tobacco abuse    • Acute on chronic respiratory failure with hypoxia and hypercapnia (HCC)         Hospital Course:  76 y.o.female with relevant PMH of tobacco abuse, advanced emphysema, 4 lpm home O2, admitted 7/24/2022 w/ AECOPD with pronounced AoC T2 Respiratory Failure.  Initial ABG w/ pH 7.19 / PCO2 130.  She is a DNR / DNI.  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  Also with some bibasilar and TONIA airspace disease on background of severe emphysema.  Sputum grew  "Stenotrophomonas susceptible to Levaquin / resistant to bactrim and ceftaz.      She has required alternating Bipap and HFNC.    Impression:  Remains on HFNC.  Appears very weak and frail.  I was able to turn Fio2 down from 80% to 40% without a drop in saturation.    Plan:    Goals of Care  Appears to have very advanced, \"end-stage\" COPD.  She is currently DNR / DNI.   Still requiring considerable respiratory support despite aggressive care.  Recommend palliative consult.  Would be hospice appropriate if there is not considerable improvement.    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  Continue Bipap hs and prn.  Continue pulmicort bid, duoneb qid w/ flutter, and prednisone 40 mg daily (taper as able) - appears to be on chronic prednisone 5 mg at home.  Continue Levaquin for Steno 7-14 days - high risk for complications from this medication but little choice of other options.  Add 7% saline neb bid.  She is too ill for bronchoscopy regarding RML findings.    High risk secondary to ongoing severe AoC T1+T2 Respiratory Failure, background of advanced \"end-stage\" emphysema, RML syndrome vs endobronchial lesion, high risk for death and decompensation.       Reid Piedra MD  Intensive Care Medicine  08/01/22 12:50 EDT       "

## 2022-08-01 NOTE — PLAN OF CARE
"Goal Outcome Evaluation:           Progress: no change  Outcome Evaluation: Patient currently up in recliner and worked with therapy this AM. Remains on high flow but decreased to 55% at this time. Pallative consulted and PRN Morphine ordered and given, patient ask, \" what was the name of that medication agian, it has really helped my breathing\". Patient informed of medication and its effects, heels elevated in chair with pillows,. Drinking Boost supplement with each meal.  "

## 2022-08-01 NOTE — PROGRESS NOTES
[facility] Medicine Services  PROGRESS NOTE    Patient Name: Iliana SÁNCHEZ Kwabena  : 1945  MRN: [id number]    Date of Admission: 2022  Primary Care Physician: Keysha Decker MD    Subjective   Subjective     CC:  pneumonia    HPI:  No new issues overnight.  Feeling a bit better today.  She was still on Bipap when I saw her this am.     ROS:  Unable to effectively obtain    Objective   Objective     Vital Signs:   Temp:  [97 °F (36.1 °C)-98.3 °F (36.8 °C)] 98 °F (36.7 °C)  Heart Rate:  [61-75] 65  Resp:  [15-20] 15  BP: (107-161)/(57-80) 107/80  Flow (L/min):  [45] 45     Physical Exam:    Constitutional - frail, in bed, on Bipap  HEENT-NCAT, mucous membranes moist  CV-RRR  Resp-on Bipap  Abd-soft, nontender, nondistended, normoactive bowel sounds  Ext-No lower extremity cyanosis, clubbing or edema bilaterally  Neuro-alert  Psych-flat  affect   Skin- No rash on exposed UE or LE bilaterally      Results Reviewed:  LAB RESULTS:      Lab 22  0843 22  0648 22  0818 22  1153 07/28/22  0606 22  0439   WBC 13.76* 10.00 11.01* 14.93* 16.41* 17.46*   HEMOGLOBIN 9.2* 8.5* 8.8* 8.2* 8.9* 8.6*   HEMATOCRIT 29.0* 25.5* 27.2* 25.8* 28.5* 26.8*   PLATELETS 229 197 208 186 184 167   .3* 97.3* 100.0* 101.2* 103.3* 100.4*   PROTIME  --  15.4* 15.1* 15.6* 14.8* 15.4*         Lab 22  0843 22  0648 22  0818 22  1153 22  0606 22  0439 22  0412   SODIUM 140 137 137 138 137   < > 132*   POTASSIUM 4.1 4.0 3.9 4.4 4.6   < > 4.7   CHLORIDE 94* 94* 93* 95* 91*   < > 87*   CO2 48.0* 44.0* 41.0* 41.0* 44.0*   < > 46.0*   ANION GAP -2.0* -1.0* 3.0* 2.0* 2.0*   < > -1.0*   BUN 24* 30* 26* 29* 27*   < > 21   CREATININE 0.49* 0.44* 0.54* 0.60 0.60   < > 0.64   EGFR 97.2 99.8 95.0 92.6 92.6   < > 91.2   GLUCOSE 87 106* 90 91 112*   < > 126*   CALCIUM 8.9 8.6 9.0 9.3 9.3   < > 8.8   MAGNESIUM  --   --   --   --   --   --  1.8    PHOSPHORUS  --   --   --   --   --   --  3.4    < > = values in this interval not displayed.             Lab 07/31/22  0648 07/30/22  0818 07/29/22  1153 07/28/22  0606 07/27/22  0439   PROTIME 15.4* 15.1* 15.6* 14.8* 15.4*   INR 1.23* 1.20* 1.24* 1.16* 1.23*                 Lab 07/28/22  1015 07/26/22  1304   PH, ARTERIAL 7.372 7.433   PCO2, ARTERIAL 88.8* 69.0*   PO2 ART 82.2* 97.7   FIO2 60 60   HCO3 ART 51.5* 46.0*   BASE EXCESS ART 23.2* 19.1*   CARBOXYHEMOGLOBIN 1.1 1.0     Brief Urine Lab Results  (Last result in the past 365 days)      Color   Clarity   Blood   Leuk Est   Nitrite   Protein   CREAT   Urine HCG        07/24/22 2057 Yellow   Cloudy   Negative   Negative   Negative   Trace                 Microbiology Results Abnormal     Procedure Component Value - Date/Time    MRSA Screen, PCR (Inpatient) - Swab, Nares [[id number]]  (Normal) Collected: 07/25/22 0855    Lab Status: Final result Specimen: Swab from Nares Updated: 07/25/22 1130     MRSA PCR Negative    Narrative:      The negative predictive value of this diagnostic test is high and should only be used to consider de-escalating anti-MRSA therapy. A positive result may indicate colonization with MRSA and must be correlated clinically.  MRSA Negative    Respiratory Panel PCR w/COVID-19(SARS-CoV-2) [facility] In-House, NP Swab in UTM/VTM, 3-4 HR TAT - Swab, Nasopharynx [[id number]]  (Normal) Collected: 07/25/22 0855    Lab Status: Final result Specimen: Swab from Nasopharynx Updated: 07/25/22 0956     ADENOVIRUS, PCR Not Detected     Coronavirus 229E Not Detected     Coronavirus HKU1 Not Detected     Coronavirus NL63 Not Detected     Coronavirus OC43 Not Detected     COVID19 Not Detected     Human Metapneumovirus Not Detected     Human Rhinovirus/Enterovirus Not Detected     Influenza A PCR Not Detected     Influenza B PCR Not Detected     Parainfluenza Virus 1 Not Detected     Parainfluenza Virus 2 Not Detected     Parainfluenza  Virus 3 Not Detected     Parainfluenza Virus 4 Not Detected     RSV, PCR Not Detected     Bordetella pertussis pcr Not Detected     Bordetella parapertussis PCR Not Detected     Chlamydophila pneumoniae PCR Not Detected     Mycoplasma pneumo by PCR Not Detected    Narrative:      In the setting of a positive respiratory panel with a viral infection PLUS a negative procalcitonin without other underlying concern for bacterial infection, consider observing off antibiotics or discontinuation of antibiotics and continue supportive care. If the respiratory panel is positive for atypical bacterial infection (Bordetella pertussis, Chlamydophila pneumoniae, or Mycoplasma pneumoniae), consider antibiotic de-escalation to target atypical bacterial infection.          XR Chest 1 View    Result Date: 8/1/2022  DATE OF EXAM: 8/1/2022 5:01 AM  PROCEDURE: XR CHEST 1 VW-  INDICATIONS: pneumonia, COPD  COMPARISON: 07/24/2022, CT chest 07/25/2022  TECHNIQUE: Single radiographic AP view of the chest was obtained.  FINDINGS: There are bibasilar effusions. There is more of a bandlike density extending out from the right infrahilar area that could relate to some atelectasis. There are emphysematous changes. There are interstitial changes throughout the lungs that in part could reflect some chronic change. This is more pronounced in left upper lobe which could relate to an infiltrate based on prior CT of the chest.      Impression: 1.  Worsening appearance to the basilar areas which could relate to increasing effusions and atelectasis. 2.  Interstitial changes within the lungs more pronounced left upper lobe that appears to reflect pneumonia based on CT chest 3.  Bandlike area of density in the right middle lobe area which could reflect atelectasis unchanged.  This report was finalized on 8/1/2022 7:50 AM by Mynor Penn MD.        Results for orders placed during the hospital encounter of 05/20/18    Adult Transthoracic Echo Complete W/  Cont if Necessary Per Protocol    Interpretation Summary  · Left ventricular systolic function is normal.  · Estimated EF appears to be in the range of 61 - 65%.  · Left ventricular diastolic dysfunction (grade I) consistent with impaired relaxation.      I have reviewed the medications:  Scheduled Meds:apixaban, 5 mg, Oral, Q12H  budesonide, 0.5 mg, Nebulization, BID - RT  busPIRone, 7.5 mg, Oral, TID  escitalopram, 20 mg, Oral, Daily  flecainide, 50 mg, Oral, Q12H  ipratropium-albuterol, 3 mL, Nebulization, 4x Daily - RT  lactobacillus acidophilus, 1 capsule, Oral, Daily  levoFLOXacin, 750 mg, Oral, Daily  lisinopril, 5 mg, Oral, Daily  nicotine, 1 patch, Transdermal, Q24H  pharmacy consult - MTM, , Does not apply, Daily  predniSONE, 40 mg, Oral, Daily  senna-docusate sodium, 2 tablet, Oral, BID  sodium chloride, 10 mL, Intravenous, Q12H      Continuous Infusions:   PRN Meds:.•  acetaminophen **OR** acetaminophen  •  senna-docusate sodium **AND** polyethylene glycol **AND** bisacodyl **AND** bisacodyl  •  magnesium sulfate **OR** magnesium sulfate **OR** magnesium sulfate  •  potassium & sodium phosphates **OR** potassium & sodium phosphates  •  potassium chloride  •  potassium chloride  •  sodium chloride    Assessment & Plan   Assessment & Plan     Active Hospital Problems    Diagnosis  POA   • **Chronic obstructive pulmonary disease with acute exacerbation (HCC) [J44.1]  Yes   • Severe malnutrition (HCC) [E43]  Yes   • Pneumonia of left upper lobe due to infectious organism [J18.9]  Yes   • Mass of right lung -suspected based on chest x-ray. [R91.8]  Yes   • Tobacco abuse [Z72.0]  Yes   • Acute on chronic respiratory failure with hypoxia and hypercapnia (HCC) [J96.21, J96.22]  Yes      Resolved Hospital Problems   No resolved problems to display.        Brief Hospital Course to date:  Iliana Yuan is a 76 y.o. female with history of atrial fibrillation, COPD on 4 liters supplemental oxygen chronically,  depression and spondylosis who presented with hypercapnic and hypoxemic respiratory failure (ABG 7.19/ >130/ 125). Respiratory culture ended up growing Stenotrophomonas.  Pulmonary has been following.     Hypercarbic hypoxemic respiratory failure  COPD with AE  Pneumonia, Stenotrophomonas  Tobacco abuse  - scheduled duonebs and budesonide  - prednisone 40 mg PO daily  - levaquin x 7 days  - diamox/diuresis prn  - percussion, postural drainage  - BiPAP HS, high flow oxygen during the day, may need Trilogy for home use  - goal oxygen saturation 88-92%  - smoking cessation counseling  -- CXR from this am shows worsening bilateral effusions and atelectasis at the bases. Unchanged RML bandlike atelectasis.   -- repeat CXR am    Possible lung mass  - lung disease too severe to proceed with any invasive study    Atrial fibrillation  - flecainide  - eliquis    Generalized weakness  - PT/OT      Expected Discharge Location and Transportation: rehab  Expected Discharge Date: 8/3    DVT prophylaxis:  Medical DVT prophylaxis orders are present.     AM-PAC 6 Clicks Score (PT): 15 (08/01/22 0800)    CODE STATUS:   Code Status and Medical Interventions:   Ordered at: 07/24/22 1617     Medical Intervention Limits:    NO intubation (DNI)     Code Status (Patient has no pulse and is not breathing):    No CPR (Do Not Attempt to Resuscitate)     Medical Interventions (Patient has pulse or is breathing):    Limited Support       Tati Mathews MD  08/01/22

## 2022-08-01 NOTE — CONSULTS
Keysha Decker MD  Consulting physician: Tadeo    No chief complaint on file.      Reason for consult: Sx mgmnt    HPI: Patient is a 76-year-old female brought to hospital due to increasing dyspnea.  The patient has a history of COPD, stating that she uses about 3 to 4 L of oxygen per nasal cannula at home.  Patient also goes on to report that she lives by herself.  Patient states that since coming hospital her breathing has not gotten any better even though she has been placed on high flow oxygen.    Pain assesment: Denies    Dyspnea: Positive    N/V: Denies    PPS: 30      Past Medical History:   Diagnosis Date   • Afib (HCC)    • Chest pain    • Chronic hypercapnic respiratory failure (HCC)    • COPD (chronic obstructive pulmonary disease) (HCC)    • Depression    • Emphysema (subcutaneous) (surgical) resulting from a procedure    • Hypertension    • Spondylosis    • Tobacco abuse      Past Surgical History:   Procedure Laterality Date   • COLON SURGERY       Current Code Status     Date Active Code Status Order ID Comments User Context       7/24/2022 1617 No CPR (Do Not Attempt to Resuscitate) [id number]  Cee Tam, NICOLE Inpatient     Advance Care Planning Activity      Questions for Current Code Status     Question Answer    Code Status (Patient has no pulse and is not breathing) No CPR (Do Not Attempt to Resuscitate)    Medical Interventions (Patient has pulse or is breathing) Limited Support    Medical Intervention Limits: NO intubation (DNI)        Current Facility-Administered Medications   Medication Dose Route Frequency Provider Last Rate Last Admin   • acetaminophen (TYLENOL) tablet 650 mg  650 mg Oral Q4H PRN Ludwin Loera MD   650 mg at 07/26/22 0616    Or   • acetaminophen (TYLENOL) suppository 650 mg  650 mg Rectal Q4H PRN Ludwin Loera MD       • apixaban (ELIQUIS) tablet 5 mg  5 mg Oral Q12H Ludwin Loera MD   5 mg at 08/01/22 0816   • sennosides-docusate (PERICOLACE)  8.6-50 MG per tablet 2 tablet  2 tablet Oral BID Galileo Jacques MD   2 tablet at 08/01/22 0815    And   • polyethylene glycol (MIRALAX) packet 17 g  17 g Oral Daily PRN Glaileo Jacques MD        And   • bisacodyl (DULCOLAX) EC tablet 5 mg  5 mg Oral Daily PRN Galileo Jacques MD   5 mg at 07/30/22 0914    And   • bisacodyl (DULCOLAX) suppository 10 mg  10 mg Rectal Daily PRN Galileo Jacques MD       • budesonide (PULMICORT) nebulizer solution 0.5 mg  0.5 mg Nebulization BID - RT Ludwin Loera MD   0.5 mg at 08/01/22 0649   • busPIRone (BUSPAR) tablet 7.5 mg  7.5 mg Oral TID Ludwin Loera MD   7.5 mg at 08/01/22 0816   • escitalopram (LEXAPRO) tablet 20 mg  20 mg Oral Daily Ludwin Loera MD   20 mg at 08/01/22 0816   • flecainide (TAMBOCOR) tablet 50 mg  50 mg Oral Q12H Ludwin Loera MD   50 mg at 08/01/22 0816   • ipratropium-albuterol (DUO-NEB) nebulizer solution 3 mL  3 mL Nebulization 4x Daily - RT Ludwin Loera MD   3 mL at 08/01/22 0649   • lactobacillus acidophilus (RISAQUAD) capsule 1 capsule  1 capsule Oral Daily Galileo Jacques MD   1 capsule at 08/01/22 0816   • levoFLOXacin (LEVAQUIN) tablet 750 mg  750 mg Oral Daily Galileo Jacques MD   750 mg at 08/01/22 0815   • lisinopril (PRINIVIL,ZESTRIL) tablet 5 mg  5 mg Oral Daily Ludwin Loera MD   5 mg at 08/01/22 0816   • Magnesium Sulfate 2 gram Bolus, followed by 8 gram infusion (total Mg dose 10 grams)- Mg less than or equal to 1mg/dL  2 g Intravenous PRN Ludwin Loera MD        Or   • Magnesium Sulfate 2 gram / 50mL Infusion (GIVE X 3 BAGS TO EQUAL 6GM TOTAL DOSE) - Mg 1.1 - 1.5 mg/dl  2 g Intravenous PRN Ludwin Loera MD        Or   • Magnesium Sulfate 4 gram infusion- Mg 1.6-1.9 mg/dL  4 g Intravenous PRN Ludwin Loera MD       • morphine injection 2 mg  2 mg Intravenous Q1H PRN Richard Palumbo, DO       • nicotine (NICODERM CQ) 21 MG/24HR patch 1 patch  1 patch Transdermal Q24H Ludwin Loera MD    1 patch at 08/01/22 0818   • Pharmacy Consult - MTM   Does not apply Daily Ludwin Loera MD       • potassium & sodium phosphates (PHOS-NAK) 280-160-250 MG packet - for Phosphorus less than 1.25 mg/dL  2 packet Oral Q6H PRN Galileo Jacques MD        Or   • potassium & sodium phosphates (PHOS-NAK) 280-160-250 MG packet - for Phosphorus 1.25 - 2.5 mg/dL  2 packet Oral Q6H PRN Galileo Jacques MD       • potassium chloride (KLOR-CON) packet 40 mEq  40 mEq Oral PRN Galileo Jacques MD       • potassium chloride (MICRO-K) CR capsule 40 mEq  40 mEq Oral PRN Galileo Jacques MD       • predniSONE (DELTASONE) tablet 40 mg  40 mg Oral Daily Galileo Jacques MD   40 mg at 08/01/22 0815   • sodium chloride 0.9 % flush 10 mL  10 mL Intravenous Q12H Ludwin Loera MD   10 mL at 08/01/22 0818   • sodium chloride 0.9 % flush 10 mL  10 mL Intravenous PRN Ludwin Loera MD       • sodium chloride 7 % nebulizer solution nebulizer solution 4 mL  4 mL Nebulization BID - RT Reid Piedra MD            •  acetaminophen **OR** acetaminophen  •  senna-docusate sodium **AND** polyethylene glycol **AND** bisacodyl **AND** bisacodyl  •  magnesium sulfate **OR** magnesium sulfate **OR** magnesium sulfate  •  Morphine  •  potassium & sodium phosphates **OR** potassium & sodium phosphates  •  potassium chloride  •  potassium chloride  •  sodium chloride  Allergies   Allergen Reactions   • Moxifloxacin Unknown - High Severity     Per OSH records   • Sulfadiazine Unknown - High Severity     Per OSH records     History reviewed. No pertinent family history.  Social History     Socioeconomic History   • Marital status:    Tobacco Use   • Smoking status: Current Every Day Smoker   • Smokeless tobacco: Never Used   Substance and Sexual Activity   • Alcohol use: No   • Drug use: No     Review of Systems -all others reviewed and found negative that mentioned in the HPI      /80 (BP Location: Left arm, Patient  "Position: Lying)   Pulse 68   Temp 98.1 °F (36.7 °C) (Axillary)   Resp 16   Ht 167.6 cm (66\")   Wt 57.4 kg (126 lb 8.7 oz)   SpO2 91%   BMI 20.42 kg/m²     Intake/Output Summary (Last 24 hours) at 8/1/2022 1406  Last data filed at 8/1/2022 1325  Gross per 24 hour   Intake 840 ml   Output 900 ml   Net -60 ml     Physical Exam:      General Appearance:    Alert, cooperative, in no acute distress   Head:    Normocephalic, without obvious abnormality, atraumatic   Eyes:            Lids and lashes normal, conjunctivae and sclerae normal, no   icterus, no pallor, corneas clear, PERRLA   Ears:    Ears appear intact with no abnormalities noted   Throat:   No oral lesions, no thrush, oral mucosa moist   Neck:   No adenopathy, supple, trachea midline, no thyromegaly, no     carotid bruit, no JVD   Back:     No kyphosis present, no scoliosis present, no skin lesions,       erythema or scars, no tenderness to percussion or                   palpation,   range of motion normal   Lungs:     Clear to auscultation,respirations regular, even and                   unlabored    Heart:    Regular rhythm and normal rate, normal S1 and S2, no            murmur, no gallop, no rub, no click   Breast Exam:    Deferred   Abdomen:     Normal bowel sounds, no masses, no organomegaly, soft        non-tender, non-distended, no guarding, no rebound                 tenderness   Genitalia:    Deferred   Extremities:   Moves all extremities well, no edema, no cyanosis, no              redness   Pulses:   Pulses palpable and equal bilaterally   Skin:   No bleeding, bruising or rash   Lymph nodes:   No palpable adenopathy   Neurologic:   Cranial nerves 2 - 12 grossly intact, sensation intact, DTR        present and equal bilaterally       Results from last 7 days   Lab Units 08/01/22  0843   WBC 10*3/mm3 13.76*   HEMOGLOBIN g/dL 9.2*   HEMATOCRIT % 29.0*   PLATELETS 10*3/mm3 229     Results from last 7 days   Lab Units 08/01/22  0843   SODIUM " mmol/L 140   POTASSIUM mmol/L 4.1   CHLORIDE mmol/L 94*   CO2 mmol/L 48.0*   BUN mg/dL 24*   CREATININE mg/dL 0.49*   CALCIUM mg/dL 8.9   GLUCOSE mg/dL 87     Results from last 7 days   Lab Units 08/01/22  0843   SODIUM mmol/L 140   POTASSIUM mmol/L 4.1   CHLORIDE mmol/L 94*   CO2 mmol/L 48.0*   BUN mg/dL 24*   CREATININE mg/dL 0.49*   GLUCOSE mg/dL 87   CALCIUM mg/dL 8.9     Imaging Results (Last 72 Hours)     Procedure Component Value Units Date/Time    XR Chest 1 View [[id number]] Collected: 08/01/22 0747     Updated: 08/01/22 0753    Narrative:      DATE OF EXAM: 8/1/2022 5:01 AM     PROCEDURE: XR CHEST 1 VW-     INDICATIONS: pneumonia, COPD     COMPARISON: 07/24/2022, CT chest 07/25/2022     TECHNIQUE: Single radiographic AP view of the chest was obtained.     FINDINGS:  There are bibasilar effusions. There is more of a bandlike density  extending out from the right infrahilar area that could relate to some  atelectasis. There are emphysematous changes. There are interstitial  changes throughout the lungs that in part could reflect some chronic  change. This is more pronounced in left upper lobe which could relate to  an infiltrate based on prior CT of the chest.        Impression:      1.  Worsening appearance to the basilar areas which could relate to  increasing effusions and atelectasis.  2.  Interstitial changes within the lungs more pronounced left upper  lobe that appears to reflect pneumonia based on CT chest  3.  Bandlike area of density in the right middle lobe area which could  reflect atelectasis unchanged.     This report was finalized on 8/1/2022 7:50 AM by Mynor Penn MD.           Impression: COPD  Dyspnea  Lung mass  Debility  [facility]    Plan: In respect to goals of care the patient is a DNR which I agree with.  I did not have any further goals of care discussions at this point time with the patient beyond the fact that she is hoping to go to rehab.  Did mention to her that before she can go to  rehab she would have to be off the high flow oxygen.  I will go ahead and add some as needed morphine which can be used to help with the patient's dyspnea.  We will see how the patient does over the next 24 to 48 hours in respect to her oxygen demand and have further discussions.      Richard Palumbo,   08/01/22  14:06 EDT

## 2022-08-01 NOTE — PLAN OF CARE
Goal Outcome Evaluation:  Plan of Care Reviewed With: patient        Progress: improving  Outcome Evaluation: Pt advanced to EOB with supervision and required minAx1 for sit to stand. Pt took steps from bed to chair with minAx1 and RW. Pt with bowel incontinence requiring dependence for all horace care. Extra time/effort as well as rest breaks required. Recommend d/c to IPR.

## 2022-08-01 NOTE — CASE MANAGEMENT/SOCIAL WORK
Continued Stay Note  [facility]     Patient Name: Iliana SÁNCHEZ Kwabena  MRN: [id number]  Today's Date: 8/1/2022    Admit Date: 7/24/2022     Discharge Plan     Row Name 08/01/22 1150       Plan    Plan SNF    Plan Comments Spoke with patient in room.  Therapy recommends SNF at discharge.  She is agreeable.  Verbal list of facilities provided.  Patient to decide on where she would like referrals.  Still wearing hi-kaleb O2.  CM will continue to follow.               Discharge Codes    No documentation.               Expected Discharge Date and Time     Expected Discharge Date Expected Discharge Time    Aug 3, 2022             Brooke Liu RN

## 2022-08-02 NOTE — PROGRESS NOTES
Continued Stay Note  [facility]     Patient Name: Iliana SÁNCHEZ Kwabena  MRN: [id number]  Today's Date: 8/2/2022    Admit Date: 7/24/2022     Discharge Plan     Row Name 08/02/22 1255       Plan    Plan Undetermined    Plan Comments Inpatient hospice team aware of referral and will plan visit tomorrow.  Dr. Mathews updated.               Discharge Codes    No documentation.               Expected Discharge Date and Time     Expected Discharge Date Expected Discharge Time    Aug 5, 2022             Pascual Herrera RN

## 2022-08-02 NOTE — PLAN OF CARE
Goal Outcome Evaluation:  Plan of Care Reviewed With: patient        Progress: no change  Outcome Evaluation: New palliative consult for assistance with GOC/hospice discussion per Dr. DIONI Piedra.  Dr. Palumbo saw pt. yesterday.  Dr. Piedra also saw pt. today and discussed that it is unlikely pt. will survive this hospitalization.  Hospice consult entered and code status adjusted.  Pt. sitting up in bed on HFNC during palliative nursing visit.  SPO2 noted to drop into mid 90s with conversation.  Pt. appeared to be very tired and answering questions was pretty taxing.  Pt. denied pain at time of visit but stated she has intermittent arthritic pain in her back which she takes celebrex for at home.  Eating about half of meals.  No further complaints from patient this morning.  No family at BS.  Palliative care to follow for support, POC and ongoing GOC conversations.     1330 Palliative IDT meeting: ERVIN He RN, CHPN; DIMA Conte RN, CHPN; NATE Andrea, APRN; KOKO Palumbo, ; NATE Herrera, RN; CAROLYN Yee, CSW      Problem: Palliative Care  Goal: Enhanced Quality of Life  Intervention: Promote Advance Care Planning  Flowsheets (Taken 8/2/2022 1217)  Life Transition/Adjustment:  • palliative care discussed  • palliative care initiated

## 2022-08-02 NOTE — PROGRESS NOTES
Palliative Care Progress Note    Date of Admission: 7/24/2022    Subjective: Patient states that her breathing seems to be doing okay as long as she is not doing anything causing her to exert herself.  Current Code Status     Date Active Code Status Order ID Comments User Context       8/2/2022 1050 No CPR (Do Not Attempt to Resuscitate) [id number]  Reid Piedra MD Inpatient     Advance Care Planning Activity      Questions for Current Code Status     Question Answer    Code Status (Patient has no pulse and is not breathing) No CPR (Do Not Attempt to Resuscitate)    Medical Interventions (Patient has pulse or is breathing) Comfort Measures    Level Of Support Discussed With Patient        No current facility-administered medications on file prior to encounter.     Current Outpatient Medications on File Prior to Encounter   Medication Sig Dispense Refill   • apixaban (ELIQUIS) 5 MG tablet tablet Take 1 tablet by mouth Every 12 (Twelve) Hours. 60 tablet    • celecoxib (CeleBREX) 100 MG capsule Take 200 mg by mouth Daily.     • escitalopram (LEXAPRO) 20 MG tablet Take 20 mg by mouth Daily.     • flecainide (TAMBOCOR) 50 MG tablet Take 1 tablet by mouth Every 12 (Twelve) Hours.     • lisinopril (PRINIVIL,ZESTRIL) 5 MG tablet Take 5 mg by mouth Daily.     • mirtazapine (REMERON) 15 MG tablet Take 7.5 mg by mouth Every Night.     • busPIRone (BUSPAR) 15 MG tablet Take 7.5 mg by mouth 3 (Three) Times a Day.     • ipratropium-albuterol (DUO-NEB) 0.5-2.5 mg/3 ml nebulizer Take 3 mL by nebulization Every 6 (Six) Hours.     • predniSONE (DELTASONE) 5 MG tablet Take 5 mg by mouth Daily.          •  acetaminophen **OR** acetaminophen  •  senna-docusate sodium **AND** polyethylene glycol **AND** bisacodyl **AND** bisacodyl  •  magnesium sulfate **OR** magnesium sulfate **OR** magnesium sulfate  •  Morphine  •  potassium & sodium phosphates **OR** potassium & sodium phosphates  •  potassium chloride  •  potassium  "chloride  •  sodium chloride    Objective: /61 (BP Location: Left arm, Patient Position: Lying)   Pulse 69   Temp 98.1 °F (36.7 °C) (Oral)   Resp 18   Ht 167.6 cm (66\")   Wt 57.4 kg (126 lb 8.7 oz)   SpO2 92%   BMI 20.42 kg/m²      Intake/Output Summary (Last 24 hours) at 8/2/2022 1248  Last data filed at 8/2/2022 0735  Gross per 24 hour   Intake 720 ml   Output 400 ml   Net 320 ml     Physical Exam:      General Appearance:    Alert, cooperative, in no acute distress   Head:    Normocephalic, without obvious abnormality, atraumatic   Eyes:            Lids and lashes normal, conjunctivae and sclerae normal, no   icterus, no pallor, corneas clear, PERRLA   Ears:    Ears appear intact with no abnormalities noted   Throat:   No oral lesions, no thrush, oral mucosa moist   Neck:   No adenopathy, supple, trachea midline, no thyromegaly, no     carotid bruit, no JVD   Back:     No kyphosis present, no scoliosis present, no skin lesions,       erythema or scars, no tenderness to percussion or                   palpation,   range of motion normal   Lungs:     Clear to auscultation,respirations regular, even and                   unlabored    Heart:    Regular rhythm and normal rate, normal S1 and S2, no            murmur, no gallop, no rub, no click   Breast Exam:    Deferred   Abdomen:     Normal bowel sounds, no masses, no organomegaly, soft        non-tender, non-distended, no guarding, no rebound                 tenderness   Genitalia:    Deferred   Extremities:   Moves all extremities well, no edema, no cyanosis, no              redness   Pulses:   Pulses palpable and equal bilaterally   Skin:   No bleeding, bruising or rash   Lymph nodes:   No palpable adenopathy   Neurologic:   Cranial nerves 2 - 12 grossly intact, sensation intact, DTR        present and equal bilaterally     Results from last 7 days   Lab Units 08/02/22  0744   WBC 10*3/mm3 13.73*   HEMOGLOBIN g/dL 8.8*   HEMATOCRIT % 27.6*   PLATELETS " 10*3/mm3 225     Results from last 7 days   Lab Units 08/02/22  0744   SODIUM mmol/L 137   POTASSIUM mmol/L 3.8   CHLORIDE mmol/L 89*   CO2 mmol/L 50.0*   BUN mg/dL 25*   CREATININE mg/dL 0.44*   CALCIUM mg/dL 8.7   GLUCOSE mg/dL 82       Impression: COPD  Dyspnea  Lung mass  Debility  [facility]  Plan: Patient remains on high flow oxygen.  I did have a discussion with her that my concern is that she may not be able to come off the high flow oxygen, and if this is the case at some point she will start to decline.  Did briefly talk to her about hospice, please note that after my visit I was told that the patient is interested in at least talking to hospice.        Richard Palumbo DO  08/02/22  12:48 EDT

## 2022-08-02 NOTE — PROGRESS NOTES
"INTENSIVIST / PULMONARY FOLLOW UP NOTE     Hospital:  LOS: 9 days   Ms. Iliana Yuan, 76 y.o. female is followed for:     Chronic obstructive pulmonary disease with acute exacerbation (HCC)    Acute on chronic respiratory failure with hypoxia and hypercapnia (HCC)    Tobacco abuse    Mass of right lung -suspected based on chest x-ray.    Severe malnutrition (HCC)    Pneumonia of left upper lobe due to infectious organism          SUBJECTIVE   Breathing \"about the same\", remains on HFNC 55%    The patient's relevant past medical, surgical, family, and social history were reviewed    Allergies and medications were reviewed    ROS:  Per subjective, all other systems were reviewed and were negative        OBJECTIVE     Vital Sign Min/Max for last 24 hours:  Temp  Min: 97.5 °F (36.4 °C)  Max: 98.7 °F (37.1 °C)   BP  Min: 130/50  Max: 155/62   Pulse  Min: 63  Max: 73   Resp  Min: 16  Max: 20   SpO2  Min: 90 %  Max: 96 %   No data recorded     Physical Exam:  General Appearance:  No acute distress  Eyes:  No scleral icterus or pallor, pupils normal  Ears, Nose, Mouth, Throat:  Atraumatic, oropharynx clear  Neck:  Trachea midline, thyroid normal  Respiratory:  diminished to auscultation bilaterally, normal effort  Cardiovascular:  Regular rate and rhythm, no murmurs, no peripheral edema  Gastrointestinal:  Soft, non-tender, non-distended, no hepatosplenomegaly  Skin:  Normal temperature, no rash  Psychiatric:  No agitation  Neuro:  No new focal neurologic deficits observed    Telemetry:              Hemodynamics:   CVP:     PAP:     PAOP:     CO:     CI:     SVI:     SVR:       SpO2: 94 % SpO2  Min: 90 %  Max: 96 %   Device:      Flow Rate:   No data recorded     Mechanical Ventilator Settings:                                         Intake/Ouptut 24 hrs (7:00AM - 6:59 AM)  Intake & Output (last 3 days)       07/30 0701 07/31 0700 07/31 0701 08/01 0700 08/01 0701 08/02 0700 08/02 0701 08/03 0700    P.O. 960  1320     " Total Intake(mL/kg) 960 (16.7)  1320 (23)     Urine (mL/kg/hr) 1700 (1.2) 900 (0.7)  400 (1.8)    Stool 0 0      Total Output 1700 900  400    Net -740 -900 +1320 -400            Urine Unmeasured Occurrence 1 x       Stool Unmeasured Occurrence 2 x 1 x            Lines, Drains & Airways     Active LDAs     Name Placement date Placement time Site Days    Peripheral IV 07/28/22 1749 Anterior;Left Forearm 07/28/22  1749  Forearm  3    External Urinary Catheter 07/24/22  1749  --  7                Hematology:  Results from last 7 days   Lab Units 08/02/22  0744 08/01/22  0843 07/31/22  0648 07/30/22  0818 07/29/22  1153 07/28/22  0606 07/27/22  0439   WBC 10*3/mm3 13.73* 13.76* 10.00 11.01* 14.93* 16.41* 17.46*   HEMOGLOBIN g/dL 8.8* 9.2* 8.5* 8.8* 8.2* 8.9* 8.6*   HEMATOCRIT % 27.6* 29.0* 25.5* 27.2* 25.8* 28.5* 26.8*   PLATELETS 10*3/mm3 225 229 197 208 186 184 167     Electrolytes, Magnesium and Phosphorus:  Results from last 7 days   Lab Units 08/02/22  0744 08/01/22  0843 07/31/22  0648 07/30/22  0818 07/29/22  1153 07/28/22  0606 07/27/22  0439   SODIUM mmol/L 137 140 137 137 138 137 139   CHLORIDE mmol/L 89* 94* 94* 93* 95* 91* 92*   POTASSIUM mmol/L 3.8 4.1 4.0 3.9 4.4 4.6 4.6   CO2 mmol/L 50.0* 48.0* 44.0* 41.0* 41.0* 44.0* 48.0*     Renal:  Results from last 7 days   Lab Units 08/02/22  0744 08/01/22  0843 07/31/22  0648 07/30/22  0818 07/29/22  1153 07/28/22  0606 07/27/22  0439   CREATININE mg/dL 0.44* 0.49* 0.44* 0.54* 0.60 0.60 0.59   BUN mg/dL 25* 24* 30* 26* 29* 27* 22     Estimated Creatinine Clearance: 97 mL/min (A) (by C-G formula based on SCr of 0.44 mg/dL (L)).  Hepatic:      Arterial Blood Gases:  Results from last 7 days   Lab Units 07/28/22  1015 07/26/22  1304   PH, ARTERIAL pH units 7.372 7.433   PCO2, ARTERIAL mm Hg 88.8* 69.0*   PO2 ART mm Hg 82.2* 97.7   FIO2 % 60 60             Lab Results   Component Value Date    LACTATE 1.2 07/24/2022       Relevant imaging studies and labs from 08/02/22  were reviewed    Medications (drips):       apixaban, 5 mg, Oral, Q12H  budesonide, 0.5 mg, Nebulization, BID - RT  busPIRone, 7.5 mg, Oral, TID  escitalopram, 20 mg, Oral, Daily  flecainide, 50 mg, Oral, Q12H  ipratropium-albuterol, 3 mL, Nebulization, 4x Daily - RT  lactobacillus acidophilus, 1 capsule, Oral, Daily  levoFLOXacin, 750 mg, Oral, Daily  lisinopril, 5 mg, Oral, Daily  methylPREDNISolone sodium succinate, 20 mg, Intravenous, Q8H  nicotine, 1 patch, Transdermal, Q24H  pharmacy consult - MTM, , Does not apply, Daily  senna-docusate sodium, 2 tablet, Oral, BID  sodium chloride, 10 mL, Intravenous, Q12H        •  acetaminophen **OR** acetaminophen  •  senna-docusate sodium **AND** polyethylene glycol **AND** bisacodyl **AND** bisacodyl  •  magnesium sulfate **OR** magnesium sulfate **OR** magnesium sulfate  •  Morphine  •  potassium & sodium phosphates **OR** potassium & sodium phosphates  •  potassium chloride  •  potassium chloride  •  sodium chloride    Assessment & Plan   IMPRESSION / PLAN     Inpatient Problem List:  76 y.o.female:  Active Hospital Problems    Diagnosis    • **Chronic obstructive pulmonary disease with acute exacerbation (HCC)    • Severe malnutrition (HCC)    • Pneumonia of left upper lobe due to infectious organism    • Mass of right lung -suspected based on chest x-ray.    • Tobacco abuse    • Acute on chronic respiratory failure with hypoxia and hypercapnia (HCC)         Hospital Course:  76 y.o.female with relevant PMH of tobacco abuse, advanced emphysema, 4 lpm home O2, admitted 7/24/2022 w/ AECOPD with pronounced AoC T2 Respiratory Failure.  Initial ABG w/ pH 7.19 / PCO2 130.  She is a DNR / DNI.  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  Also with some bibasilar and TONIA airspace disease on background of severe emphysema.  Sputum grew Stenotrophomonas susceptible to Levaquin / resistant to bactrim and ceftaz.      She has  "required alternating Bipap and HFNC.    Impression:  Remains on HFNC.  No progress since yesterday.    Plan:    Goals of Care  Appears to have very advanced, \"end-stage\" COPD.  She is currently DNR / DNI.   Still requiring considerable respiratory support despite aggressive care.  Appreciate palliative input.  Discussed situation with patient this morning.  She knows she is likely not going to survive this hospitalization.  She is interested in comfort measures / hospice.  Would still want to keep nebs / abx for now.    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  Continue Bipap hs and prn.  Continue pulmicort bid, duoneb qid w/ flutter, and prednisone 40 mg daily (taper as able) - appears to be on chronic prednisone 5 mg at home.  Continue Levaquin for Steno 7-14 days - high risk for complications from this medication but little choice of other options.  She is too ill for bronchoscopy regarding RML findings.  Transition goals to more comfort based as above.    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       Reid Piedra MD  Intensive Care Medicine  08/02/22 10:50 EDT       "

## 2022-08-02 NOTE — PROGRESS NOTES
NN spoke with pt at .  Pt alert and able to answer questions appropriately. Pt O2 sat  91% on  55% HFNC currently, home O2 use 3.5-4 L. Deep breathing exercises encouraged. Palliative service was added yesterday, patient plans to discharge to rehab.  No new concerns or questions voiced at this time.  NN will continue to follow as needed.       Per current GOLD Standards, please consider: No LAMA/LABA/ICS in place, Outpatient PFT, Rehab as appropriate, Palliative Care consult, NRT at MA, Annual LDCT per current screening guidelines (age 50-80 years old, smoking history of 20 pack years or more or has quit within past 15 years)

## 2022-08-02 NOTE — DISCHARGE SUMMARY
[facility] Medicine Services  DISCHARGE SUMMARY    Patient Name: Iliana Yuan  : 1945  MRN: [id number]    Date of Admission: 2022  3:50 PM  Date of Discharge:  2022  Primary Care Physician: Keysha Decker MD    Consults     Date and Time Order Name Status Description    2022  1:03 PM Inpatient Palliative Care MD Consult Completed           Hospital Course     Presenting Problem:   Acute on chronic respiratory failure with hypoxia and hypercapnia (HCC) [J96.21, J96.22]    Active Hospital Problems    Diagnosis  POA   • **Chronic obstructive pulmonary disease with acute exacerbation (HCC) [J44.1]  Yes   • Severe malnutrition (HCC) [E43]  Yes   • Pneumonia of left upper lobe due to infectious organism [J18.9]  Yes   • Mass of right lung -suspected based on chest x-ray. [R91.8]  Yes   • Tobacco abuse [Z72.0]  Yes   • Acute on chronic respiratory failure with hypoxia and hypercapnia (HCC) [J96.21, J96.22]  Yes      Resolved Hospital Problems   No resolved problems to display.          Hospital Course:  Iliana Yuan is a 76 y.o. female with history of atrial fibrillation on Eliquis, COPD on 4 liters supplemental oxygen chronically, depression and spondylosis who presented with hypercapnic and hypoxemic respiratory failure (ABG 7.19/ >130/ 125). Respiratory culture ended up growing Stenotrophomonas.  Pulmonary has been following. Pt has now decided to pursue comfort measures. Hospice has been consulted. Likely transition to inpatient hospice later today.      Hypercarbic hypoxemic respiratory failure  COPD with AE  Pneumonia, Stenotrophomonas  Tobacco abuse  - scheduled duonebs and budesonide  - prednisone 40 mg PO daily  - levaquin x 7- 14 days  - diamox/diuresis prn  - percussion, postural drainage  - BiPAP HS, high flow oxygen during the day- unable to wean her from HFNC. Pulm d/w patient this am and should would like to transition to Hospice services.       Possible lung mass  - lung disease too severe to proceed with any invasive study     Atrial fibrillation  - flecainide  - eliquis          Day of Discharge     HPI:   No new issues overnight. Denies any complaints other than wanting her bed adjusted so she can lie down again.    Review of Systems  Gen- No fevers, chills  CV- No chest pain, palpitations  Resp-+ SOA  GI- No N/V/D, abd pain        Vital Signs:   Temp:  [97.5 °F (36.4 °C)-98.7 °F (37.1 °C)] 98.1 °F (36.7 °C)  Heart Rate:  [63-73] 69  Resp:  [16-20] 18  BP: (130-155)/(50-72) 154/61  Flow (L/min):  [50-60] 50      Physical Exam:  Constitutional - frail, in bed, on Bipap  HEENT-NCAT, mucous membranes moist  CV-RRR  Resp-on Bipap  Abd-soft, nontender, nondistended, normoactive bowel sounds  Ext-No lower extremity cyanosis, clubbing or edema bilaterally  Neuro-alert  Psych-flat  affect   Skin- No rash on exposed UE or LE bilaterally    Pertinent  and/or Most Recent Results     LAB RESULTS:      Lab 08/02/22  0744 08/01/22  0843 07/31/22  0648 07/30/22  0818 07/29/22  1153 07/28/22  0606 07/27/22  0439   WBC 13.73* 13.76* 10.00 11.01* 14.93* 16.41* 17.46*   HEMOGLOBIN 8.8* 9.2* 8.5* 8.8* 8.2* 8.9* 8.6*   HEMATOCRIT 27.6* 29.0* 25.5* 27.2* 25.8* 28.5* 26.8*   PLATELETS 225 229 197 208 186 184 167   NEUTROS ABS 11.35*  --   --   --   --   --   --    IMMATURE GRANS (ABS) 0.08*  --   --   --   --   --   --    LYMPHS ABS 0.71  --   --   --   --   --   --    MONOS ABS 1.52*  --   --   --   --   --   --    EOS ABS 0.06  --   --   --   --   --   --    .7* 100.3* 97.3* 100.0* 101.2* 103.3* 100.4*   PROTIME  --   --  15.4* 15.1* 15.6* 14.8* 15.4*         Lab 08/02/22  0744 08/01/22  0843 07/31/22  0648 07/30/22  0818 07/29/22  1153   SODIUM 137 140 137 137 138   POTASSIUM 3.8 4.1 4.0 3.9 4.4   CHLORIDE 89* 94* 94* 93* 95*   CO2 50.0* 48.0* 44.0* 41.0* 41.0*   ANION GAP -2.0* -2.0* -1.0* 3.0* 2.0*   BUN 25* 24* 30* 26* 29*   CREATININE 0.44* 0.49* 0.44*  0.54* 0.60   EGFR 99.8 97.2 99.8 95.0 92.6   GLUCOSE 82 87 106* 90 91   CALCIUM 8.7 8.9 8.6 9.0 9.3             Lab 07/31/22  0648 07/30/22  0818 07/29/22  1153 07/28/22  0606 07/27/22  0439   PROTIME 15.4* 15.1* 15.6* 14.8* 15.4*   INR 1.23* 1.20* 1.24* 1.16* 1.23*                 Lab 07/28/22  1015 07/26/22  1304   PH, ARTERIAL 7.372 7.433   PCO2, ARTERIAL 88.8* 69.0*   PO2 ART 82.2* 97.7   FIO2 60 60   HCO3 ART 51.5* 46.0*   BASE EXCESS ART 23.2* 19.1*   CARBOXYHEMOGLOBIN 1.1 1.0     Brief Urine Lab Results  (Last result in the past 365 days)      Color   Clarity   Blood   Leuk Est   Nitrite   Protein   CREAT   Urine HCG        07/24/22 2057 Yellow   Cloudy   Negative   Negative   Negative   Trace               Microbiology Results (last 10 days)     Procedure Component Value - Date/Time    Respiratory Culture - Sputum, Cough [[id number]]  (Abnormal)  (Susceptibility) Collected: 07/26/22 0412    Lab Status: Edited Result - FINAL Specimen: Sputum from Cough Updated: 07/31/22 0624     Respiratory Culture Heavy growth (4+) Stenotrophomonas maltophilia      No Normal Respiratory Rose     Gram Stain Many (4+) WBCs per low power field      Occasional Epithelial cells per low power field      Rare (1+) Gram negative bacilli    Narrative:            Susceptibility      Stenotrophomonas maltophilia      MANUEL Not Specified      Ceftazidime  Resistant  [1]      Levofloxacin Susceptible       Minocycline  Susceptible      Trimethoprim + Sulfamethoxazole Resistant                      [1]  Appended report. These results have been appended to a previously final verified report.                 Respiratory Panel PCR w/COVID-19(SARS-CoV-2) [facility] In-House, NP Swab in [facility], 3-4 HR TAT - Swab, Nasopharynx [[id number]]  (Normal) Collected: 07/25/22 0855    Lab Status: Final result Specimen: Swab from Nasopharynx Updated: 07/25/22 0956     ADENOVIRUS, PCR Not Detected     Coronavirus 229E Not Detected      Coronavirus HKU1 Not Detected     Coronavirus NL63 Not Detected     Coronavirus OC43 Not Detected     COVID19 Not Detected     Human Metapneumovirus Not Detected     Human Rhinovirus/Enterovirus Not Detected     Influenza A PCR Not Detected     Influenza B PCR Not Detected     Parainfluenza Virus 1 Not Detected     Parainfluenza Virus 2 Not Detected     Parainfluenza Virus 3 Not Detected     Parainfluenza Virus 4 Not Detected     RSV, PCR Not Detected     Bordetella pertussis pcr Not Detected     Bordetella parapertussis PCR Not Detected     Chlamydophila pneumoniae PCR Not Detected     Mycoplasma pneumo by PCR Not Detected    Narrative:      In the setting of a positive respiratory panel with a viral infection PLUS a negative procalcitonin without other underlying concern for bacterial infection, consider observing off antibiotics or discontinuation of antibiotics and continue supportive care. If the respiratory panel is positive for atypical bacterial infection (Bordetella pertussis, Chlamydophila pneumoniae, or Mycoplasma pneumoniae), consider antibiotic de-escalation to target atypical bacterial infection.    MRSA Screen, PCR (Inpatient) - Swab, Nares [[id number]]  (Normal) Collected: 07/25/22 0855    Lab Status: Final result Specimen: Swab from Nares Updated: 07/25/22 1130     MRSA PCR Negative    Narrative:      The negative predictive value of this diagnostic test is high and should only be used to consider de-escalating anti-MRSA therapy. A positive result may indicate colonization with MRSA and must be correlated clinically.  MRSA Negative          CT Chest With Contrast Diagnostic    Result Date: 7/25/2022  DATE OF EXAM: 7/25/2022 11:04 AM  PROCEDURE: CT CHEST W CONTRAST DIAGNOSTIC-  INDICATIONS: Abnormal xray - lung nodule, >= 1 cm  COMPARISON: Concurrent portable chest radiograph  TECHNIQUE: Routine transaxial slices were obtained through the chest after the intravenous administration of 75 mL of Isovue  "300. Reconstructed coronal and sagittal images were also obtained. Automated exposure control and iterative construction methods were used.  The radiation dose reduction device was turned on for each scan per the ALARA (As Low as Reasonably Achievable) protocol.  FINDINGS: Chest x-ray report indicates possible right hilar mass and right perihilar atelectasis, chronic changes of COPD.  The CT scan shows multinodular enlargement of the left thyroid lobe and isthmus, and normal-sized right thyroid lobe. No significant mediastinal or hilar adenopathy or mass is seen. No lower cervical or axillary adenopathy is appreciated. Patient appears cachectic. There is no pericardial effusion but there are minimal pleural effusions. There is advanced changes of COPD, and although difficult to appreciate on chest x-ray, a moderately extensive left upper lobe pneumonia. There is mild dependent atelectasis the posterior lower lobes. The patient's right hilar \"mass \"on CT scan has an appearance more typical of postinflammatory scarring, elongated and roughly wedge shaped. This does appear to have developed since 2018, when there are only chronic changes of COPD in this region. Review of CT scan appears to show multiple bronchi in the right hilum which appear partially opacified, possibly with mucin. There is no visible underlying hilar mass. There are similar areas of partially opacified left-sided bronchi as well. No other potential focus of pneumonia or other mass is seen. There is no evidence of pneumothorax.  Included images of the upper abdomen show mild diffuse fatty liver change. No significant abnormalities are seen of the spleen, included pancreatic tail, adrenal glands, or upper renal poles. Gallbladder is partially included on this study and appears distended but otherwise unremarkable. Bony structures appear to be intact.       1. Previously noted right parahilar mass has a CT scan appearance more typical for an elongated " area of postinflammatory scarring or subsegmental atelectasis. CT images suggest mucinous opacification or other similar obstruction of the underlying subsegmental bronchus and also multiple other partially opacified subsegmental bronchi. No visible hilar mass. 2. Advanced changes of COPD. 3. Moderately extensive left upper lobe airspace disease, consistent with left upper lobe pneumonia. No definite pneumonia elsewhere. 4. Very small pleural effusions and mild associated discoid atelectasis.  This report was finalized on 7/25/2022 12:33 PM by Dr. Nicola Church MD.      XR Chest 1 View    Result Date: 8/2/2022  DATE OF EXAM: 8/2/2022 4:57 AM  PROCEDURE: XR CHEST 1 VW-  INDICATIONS: PNA, COPD exacerbation  COMPARISON: 08/01/2022  TECHNIQUE: Single radiographic AP view of the chest was obtained.  FINDINGS: There remains a bandlike area of density extending out from the hilar region into the right lung that could relate to atelectasis. There are interstitial changes throughout both lungs without change. There are bibasilar densities which could reflect effusions and probably atelectasis. There are emphysematous changes noted.      1.  Overall the chest is similar to the prior day study.  This report was finalized on 8/2/2022 8:20 AM by Mynor Penn MD.      XR Chest 1 View    Result Date: 8/1/2022  DATE OF EXAM: 8/1/2022 5:01 AM  PROCEDURE: XR CHEST 1 VW-  INDICATIONS: pneumonia, COPD  COMPARISON: 07/24/2022, CT chest 07/25/2022  TECHNIQUE: Single radiographic AP view of the chest was obtained.  FINDINGS: There are bibasilar effusions. There is more of a bandlike density extending out from the right infrahilar area that could relate to some atelectasis. There are emphysematous changes. There are interstitial changes throughout the lungs that in part could reflect some chronic change. This is more pronounced in left upper lobe which could relate to an infiltrate based on prior CT of the chest.      1.  Worsening appearance  to the basilar areas which could relate to increasing effusions and atelectasis. 2.  Interstitial changes within the lungs more pronounced left upper lobe that appears to reflect pneumonia based on CT chest 3.  Bandlike area of density in the right middle lobe area which could reflect atelectasis unchanged.  This report was finalized on 8/1/2022 7:50 AM by Mynor Penn MD.      XR Chest 1 View    Result Date: 7/24/2022  DATE OF EXAM: 7/24/2022 4:28 PM  PROCEDURE: XR CHEST 1 VW-  INDICATIONS: pneumonia with COPD exacerbation  COMPARISON: 5/25/2018  TECHNIQUE: Single radiographic AP view of the chest was obtained.  FINDINGS: Heart size and pulmonary vasculature are within normal limits. The lungs are hyperinflated. There is increased density and slight distortion of the right hilum with right perihilar atelectasis. Bony changes are present in the left lung       1. COPD 2. Possible right hilar mass with right perihilar atelectasis. Recommend CT chest  This report was finalized on 7/24/2022 4:51 PM by Delroy Prescott.                Results for orders placed during the hospital encounter of 05/20/18    Adult Transthoracic Echo Complete W/ Cont if Necessary Per Protocol    Interpretation Summary  · Left ventricular systolic function is normal.  · Estimated EF appears to be in the range of 61 - 65%.  · Left ventricular diastolic dysfunction (grade I) consistent with impaired relaxation.      Plan for Follow-up of Pending Labs/Results:     Discharge Details        Discharge Medications      ASK your doctor about these medications      Instructions Start Date   apixaban 5 MG tablet tablet  Commonly known as: ELIQUIS   5 mg, Oral, Every 12 Hours Scheduled      busPIRone 15 MG tablet  Commonly known as: BUSPAR   7.5 mg, Oral, 3 Times Daily      celecoxib 100 MG capsule  Commonly known as: CeleBREX   200 mg, Oral, Daily      escitalopram 20 MG tablet  Commonly known as: LEXAPRO   20 mg, Oral, Daily      flecainide 50 MG  tablet  Commonly known as: TAMBOCOR   50 mg, Oral, Every 12 Hours Scheduled      ipratropium-albuterol 0.5-2.5 mg/3 ml nebulizer  Commonly known as: DUO-NEB   3 mL, Nebulization, Every 6 Hours      lisinopril 5 MG tablet  Commonly known as: PRINIVIL,ZESTRIL   5 mg, Oral, Daily      mirtazapine 15 MG tablet  Commonly known as: REMERON   7.5 mg, Oral, Nightly      predniSONE 5 MG tablet  Commonly known as: DELTASONE   5 mg, Oral, Daily             Allergies   Allergen Reactions   • Moxifloxacin Unknown - High Severity     Per OSH records   • Sulfadiazine Unknown - High Severity     Per OSH records         Discharge Disposition:      Diet:  Hospital:  Diet Order   Procedures   • Diet Regular; Cardiac       Activity:      Restrictions or Other Recommendations:         CODE STATUS:    Code Status and Medical Interventions:   Ordered at: 08/02/22 1050     Level Of Support Discussed With:    Patient     Code Status (Patient has no pulse and is not breathing):    No CPR (Do Not Attempt to Resuscitate)     Medical Interventions (Patient has pulse or is breathing):    Comfort Measures       No future appointments.              Tati Mathews MD  08/02/22      Time Spent on Discharge:  I spent  25  minutes on this discharge activity which included: face-to-face encounter with the patient, reviewing the data in the system, coordination of the care with the nursing staff as well as consultants, documentation, and entering orders.

## 2022-08-02 NOTE — DISCHARGE PLACEMENT REQUEST
"Iliana Cruz (76 y.o. Female)             Date of Birth   1945    Social Security Number       Address   [address] [place] [address]    Home Phone   [phone]    MRN   [id number]       Mandaeism   Buddhism    Marital Status                               Admission Date   7/24/22    Admission Type   Urgent    Admitting Provider   Tati Mathews MD    Attending Provider   Tati Mathews MD    Department, Room/Bed   [facility] 3E, S334/1       Discharge Date       Discharge Disposition       Discharge Destination                               Attending Provider: Tati Mathews MD    Allergies: Moxifloxacin, Sulfadiazine    Isolation: None   Infection: None   Code Status: No CPR   Advance Care Planning Activity    Ht: 167.6 cm (66\")   Wt: 57.4 kg (126 lb 8.7 oz)    Admission Cmt: None   Principal Problem: Chronic obstructive pulmonary disease with acute exacerbation (HCC) [J44.1]                 Active Insurance as of 7/24/2022     Primary Coverage     Payor Plan Insurance Group Employer/Plan Group    HUMANA MEDICARE REPLACEMENT HUMANA MEDICARE REPLACEMENT [id number]     Payor Plan Address Payor Plan Phone Number Payor Plan Fax Number Effective Dates    [address] [address] [phone]  1/1/2018 - None Entered    [facility] [id number]       Subscriber Name Subscriber Birth Date Member ID       ILIANA CRUZ 1945 [id number]                 Emergency Contacts      (Rel.) Home Phone Work Phone Mobile Phone    Jody Javed (Daughter) -- -- [phone]            Emergency Contact Information     Name Relation Home Work Mobile    Jody Javed Daughter   [phone]          Insurance Information                HUMANA MEDICARE REPLACEMENT/HUMANA MEDICARE REPLACEMENT Phone: [phone]    Subscriber: Iliana Cruz Subscriber#: [id number]    Group#: [id number] Precert#: [id number]             History & Physical      Ludwin Loera MD " at 07/24/22 1616          Pulmonary/Critical Care History and Physical Exam     LOS: 0 days   Patient Care Team:  Delroy Eduardo MD as PCP - General  Delroy Eduardo MD as PCP - Family Medicine    Chief Complaint: AMS    Subjective     HPI:   Ms. Yuan is a 77 yo female with PMH A. 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 Information is limited as patient is currently requiring Bipap, documentation is limited and no family is present. According to EMS report they were called to the patients home earlier this morning when she was found unresponsive by family. She was taken to [facility] emergency department for evaluation.    Significant Labs at OSH include: ABG 7.19 / >130 / 125, BUN 19, creat 0.55, Na 130, K 4.9, AST 21, ALT 20, Alk phos 64, Bili 0.5, albumin 4.2, lactate 0.5, mag 1.7, troponin 92 (on high sensitivity scale), , WBC 10, HgB 10.6, Plts 159, UA + protein, blood, WBC and bacteria. CXR with bibasilar opacities. CT head negative for acute intracranial abnormalities.    While at OSH she was placed on bipap and it was felt she needed higher level of care so she was transferred to [facility] for evaluation and management.     On arrival patient is alert and oriented with stimulation however does become drowsy/somnolent. She remains on bipap. Upon coming off bipap to be transferred to the bed she became hypoxic with Spo2 in the 50%'s. She was immediately placed back on bipap with resolution of O2 Sats in 90%.    Patient reports she has a BiPAP at home but does not use it.      History taken from: chart    Past Medical History:   Diagnosis Date   • Afib (HCC)    • Chest pain    • Chronic hypercapnic respiratory failure (HCC)    • COPD (chronic obstructive pulmonary disease) (HCC)    •  Depression    • Emphysema (subcutaneous) (surgical) resulting from a procedure    • Hypertension    • Spondylosis    • Tobacco abuse        Past Surgical History:   Procedure Laterality Date   • COLON SURGERY         History reviewed. No pertinent family history.    Social History     Socioeconomic History   • Marital status:    Tobacco Use   • Smoking status: Current Every Day Smoker   • Smokeless tobacco: Never Used   Substance and Sexual Activity   • Alcohol use: No   • Drug use: No       Allergies   Allergen Reactions   • Moxifloxacin Unknown - High Severity     Per OSH records   • Sulfadiazine Unknown - High Severity     Per OSH records       Past Medical History/Family History/Social History were reviewed by me and updates were made.     Review of Systems:    Review of 14 systems was completed with positives and pertinent negatives noted in the subjective section.  All other systems reviewed and are negative.         Objective     Vital Signs  Temp:  [97.5 °F (36.4 °C)] 97.5 °F (36.4 °C)  Heart Rate:  [56-62] 60  Resp:  [20] 20  BP: (116-141)/(48-89) 132/52  Body mass index is 21.06 kg/m².     No intake or output data in the 24 hours ending 07/24/22 1819   Physical Exam:     General Appearance:   Somnolent but arousable, cooperative   Head:    Normocephalic, without obvious abnormality, atraumatic   Eyes:            Lids and lashes normal, conjunctivae and sclerae normal,    no icterus, no pallor, corneas clear, PER   ENMT:   Ears appear intact with no abnormalities noted      No oral lesions, no thrush, oral mucosa dry   Neck:   No adenopathy, supple, trachea midline, no thyromegaly,      no carotid bruit, no JVD   Lungs/resp:    On BiPAP, symmetric chest rise, no crepitus, diminished breath sounds diffusely/bilaterally, no chest wall tenderness                  Heart/CV:    Regular rhythm and normal rate, normal S1 and S2, no         murmur   Abdomen/GI:     Normal bowel sounds, no masses, no  organomegaly, soft,    nontender, nondistended   G/U:     Deferred   Extremities/MSK:   No clubbing, cyanosis or edema.  Normal tone.  No deformities.    Pulses:   Pulses palpable and equal bilaterally   Skin:   No bleeding, bruising or rash   Hem/Lymph:   No cervical or supraclavicular adenopathy.    Neurologic:   Moves all extremities with no obvious focal motor deficit.  Cranial nerves 2 - 12 grossly intact            Psychiatric: Nonagitated, oriented x 3.     The above findings are documentation of my personal physical exam findings from today.   Electronically signed by:  Ludwin Loera MD  07/24/22  18:19 EDT     Results Review:     I reviewed the patient's new clinical results.   Results from last 7 days   Lab Units 07/24/22  1620   SODIUM mmol/L 129*   POTASSIUM mmol/L 5.1   CHLORIDE mmol/L 82*   CO2 mmol/L 45.0*   BUN mg/dL 18   CREATININE mg/dL 0.53*   CALCIUM mg/dL 9.0   BILIRUBIN mg/dL 0.5   ALK PHOS U/L 56   ALT (SGPT) U/L 11   AST (SGOT) U/L 22   GLUCOSE mg/dL 111*     Results from last 7 days   Lab Units 07/24/22  1620   WBC 10*3/mm3 10.79   HEMOGLOBIN g/dL 10.9*   HEMATOCRIT % 34.0   PLATELETS 10*3/mm3 137*     Results from last 7 days   Lab Units 07/24/22  1641   PH, ARTERIAL pH units 7.217*   PO2 ART mm Hg 101.0   HCO3 ART mmol/L 49.2*       I reviewed the patient's new imaging including images and reports.    Chest x-ray shows hyperinflation/emphysema with some fullness at the right deanna with some atelectasis extending either into the inferior aspect of the upper lobe or the superior aspect of the right lower lobe.    Medication Review:   [START ON 7/25/2022] Pharmacy Consult, , Does not apply, Daily  budesonide, 0.5 mg, Nebulization, BID - RT  doxycycline, 100 mg, Intravenous, Q12H  enoxaparin, 40 mg, Subcutaneous, Daily  ipratropium-albuterol, 3 mL, Nebulization, 4x Daily - RT  [START ON 7/25/2022] pantoprazole, 40 mg, Intravenous, Q AM  piperacillin-tazobactam, 3.375 g, Intravenous,  Once  [START ON 7/25/2022] piperacillin-tazobactam, 3.375 g, Intravenous, Q8H  [START ON 7/25/2022] predniSONE, 40 mg, Oral, BID With Meals  sodium chloride, 10 mL, Intravenous, Q12H  vancomycin, 1,250 mg, Intravenous, Once      Pharmacy Consult - Pharmacy to dose,         Assessment & Plan     Active Hospital Problems    Diagnosis    • **Chronic obstructive pulmonary disease with acute exacerbation (HCC)    • Mass of right lung -suspected based on chest x-ray.    • Tobacco abuse    • Acute on chronic respiratory failure with hypoxia and hypercapnia (HCC)      I performed an independent history and physical examination. Portions of the history were obtained by APRN and were modified by me according to my findings. The above note reflects my findings, assessment, and plan.    Ludwin Loera MD    76 y.o. female smoker with history of hypertension, atrial fibrillation on Eliquis, COPD/emphysema (on 4 L oxygen which she has been wearing around-the-clock), depression, spondylosis, transferred to [facility] from [facility] emergency department after presenting there with minimal responsiveness.  Patient had hypercapnic/hypoxemic respiratory failure at outside hospital with ABG showing pH of 7.19, PCO2 greater than 130, and PO2 of 125.  CT head was negative for acute intracranial abnormality.    Chest x-ray shows some atelectasis adjacent to the right deanna or lower lobe concerning for endobronchial obstruction/mass with associated atelectasis.  She has hyponatremia.    Patient is a DNR/no intubation.    Patient will be admitted to the intensive care unit for management of acute on chronic mixed hypercapnic and hypoxemic respiratory failure in the setting of COPD with potential underlying lung mass and an active smoker.    Plan:  1.  For acute on chronic mixed hypoxemic and hypercapnic respiratory failure: BiPAP.  Goal oxygen saturation 88 to 92%.  Treatment of underlying COPD and possible  pneumonia.  2.  For possible pneumonia versus atelectasis: Empiric antibiotics with vancomycin, doxycycline, and Zosyn.  3.  For hyponatremia: Gentle hydration with normal saline and monitor for now.  4.  For COPD with acute exacerbation: Steroids, antibiotics (as above), bronchodilators/inhaled corticosteroids.  5.  For possible lung mass: Ultimately will need a CT scan.  I will wait until she is a bit more stable.  6.  For cigarette nicotine dependence: Nicotine replacement.   on smoking cessation when appropriate.  7.  DVT prophylaxis: Lovenox.    Patient is critically ill secondary to respiratory failure and has high risk of life-threatening decline in condition.  As such, she requires continuous monitoring frequent reassessment for consideration of adjustment in management to minimize this risk.  Personally reassessed her multiple times today.    Critical care time : 45 minutes spent by me personally.(This excludes time spent performing separately reportable procedures and services). Critical care time includes high complexity decision making to assess, manipulate, and support vital organ system failure in this individual who has impairment of one or more vital organ systems such that there is a high probability of imminent or life threatening deterioration in the patient’s condition.    Electronically signed by:  Ludwin Loera MD  07/24/22  18:19 EDT      • Please note that portions of this note were completed with [employer] - a voice recognition program.       Electronically signed by Ludwin Loera MD at 07/24/22 5247

## 2022-08-02 NOTE — PROGRESS NOTES
[facility] Medicine Services  PROGRESS NOTE    Patient Name: Iliana SÁNCHEZ Kawbena  : 1945  MRN: [id number]    Date of Admission: 2022  Primary Care Physician: Keysha Decker MD    Subjective   Subjective     CC:  pneumonia    HPI:  No new issues overnight. Wants to transition to hospice.     ROS:  Unable to effectively obtain    Objective   Objective     Vital Signs:   Temp:  [97.5 °F (36.4 °C)-98.7 °F (37.1 °C)] 97.5 °F (36.4 °C)  Heart Rate:  [63-73] 67  Resp:  [15-20] 16  BP: (107-148)/(50-80) 145/58  Flow (L/min):  [50-60] 50     Physical Exam:    Constitutional - frail, in bed, on Bipap  HEENT-NCAT, mucous membranes moist  CV-RRR  Resp-on Bipap  Abd-soft, nontender, nondistended, normoactive bowel sounds  Ext-No lower extremity cyanosis, clubbing or edema bilaterally  Neuro-alert  Psych-flat  affect   Skin- No rash on exposed UE or LE bilaterally      Results Reviewed:  LAB RESULTS:      Lab 22  0843 22  0648 22  0818 22  1153 22  0606 22  0439   WBC 13.76* 10.00 11.01* 14.93* 16.41* 17.46*   HEMOGLOBIN 9.2* 8.5* 8.8* 8.2* 8.9* 8.6*   HEMATOCRIT 29.0* 25.5* 27.2* 25.8* 28.5* 26.8*   PLATELETS 229 197 208 186 184 167   .3* 97.3* 100.0* 101.2* 103.3* 100.4*   PROTIME  --  15.4* 15.1* 15.6* 14.8* 15.4*         Lab 22  0843 22  0648 22  0818 22  1153 22  0606   SODIUM 140 137 137 138 137   POTASSIUM 4.1 4.0 3.9 4.4 4.6   CHLORIDE 94* 94* 93* 95* 91*   CO2 48.0* 44.0* 41.0* 41.0* 44.0*   ANION GAP -2.0* -1.0* 3.0* 2.0* 2.0*   BUN 24* 30* * 29* 27*   CREATININE 0.49* 0.44* 0.54* 0.60 0.60   EGFR 97.2 99.8 95.0 92.6 92.6   GLUCOSE 87 106* 90 91 112*   CALCIUM 8.9 8.6 9.0 9.3 9.3             Lab 22  0648 22  0818 22  1153 22  0606 22  0439   PROTIME 15.4* 15.1* 15.6* 14.8* 15.4*   INR 1.23* 1.20* 1.24* 1.16* 1.23*                 Lab 22  1015 22  1304   PH,  ARTERIAL 7.372 7.433   PCO2, ARTERIAL 88.8* 69.0*   PO2 ART 82.2* 97.7   FIO2 60 60   HCO3 ART 51.5* 46.0*   BASE EXCESS ART 23.2* 19.1*   CARBOXYHEMOGLOBIN 1.1 1.0     Brief Urine Lab Results  (Last result in the past 365 days)      Color   Clarity   Blood   Leuk Est   Nitrite   Protein   CREAT   Urine HCG        07/24/22 2057 Yellow   Cloudy   Negative   Negative   Negative   Trace                 Microbiology Results Abnormal     Procedure Component Value - Date/Time    MRSA Screen, PCR (Inpatient) - Swab, Nares [[id number]]  (Normal) Collected: 07/25/22 0855    Lab Status: Final result Specimen: Swab from Nares Updated: 07/25/22 1130     MRSA PCR Negative    Narrative:      The negative predictive value of this diagnostic test is high and should only be used to consider de-escalating anti-MRSA therapy. A positive result may indicate colonization with MRSA and must be correlated clinically.  MRSA Negative    Respiratory Panel PCR w/COVID-19(SARS-CoV-2) [facility] In-House, NP Swab in UTM/VTM, 3-4 HR TAT - Swab, Nasopharynx [[id number]]  (Normal) Collected: 07/25/22 0855    Lab Status: Final result Specimen: Swab from Nasopharynx Updated: 07/25/22 0956     ADENOVIRUS, PCR Not Detected     Coronavirus 229E Not Detected     Coronavirus HKU1 Not Detected     Coronavirus NL63 Not Detected     Coronavirus OC43 Not Detected     COVID19 Not Detected     Human Metapneumovirus Not Detected     Human Rhinovirus/Enterovirus Not Detected     Influenza A PCR Not Detected     Influenza B PCR Not Detected     Parainfluenza Virus 1 Not Detected     Parainfluenza Virus 2 Not Detected     Parainfluenza Virus 3 Not Detected     Parainfluenza Virus 4 Not Detected     RSV, PCR Not Detected     Bordetella pertussis pcr Not Detected     Bordetella parapertussis PCR Not Detected     Chlamydophila pneumoniae PCR Not Detected     Mycoplasma pneumo by PCR Not Detected    Narrative:      In the setting of a positive  respiratory panel with a viral infection PLUS a negative procalcitonin without other underlying concern for bacterial infection, consider observing off antibiotics or discontinuation of antibiotics and continue supportive care. If the respiratory panel is positive for atypical bacterial infection (Bordetella pertussis, Chlamydophila pneumoniae, or Mycoplasma pneumoniae), consider antibiotic de-escalation to target atypical bacterial infection.          XR Chest 1 View    Result Date: 8/1/2022  DATE OF EXAM: 8/1/2022 5:01 AM  PROCEDURE: XR CHEST 1 VW-  INDICATIONS: pneumonia, COPD  COMPARISON: 07/24/2022, CT chest 07/25/2022  TECHNIQUE: Single radiographic AP view of the chest was obtained.  FINDINGS: There are bibasilar effusions. There is more of a bandlike density extending out from the right infrahilar area that could relate to some atelectasis. There are emphysematous changes. There are interstitial changes throughout the lungs that in part could reflect some chronic change. This is more pronounced in left upper lobe which could relate to an infiltrate based on prior CT of the chest.      Impression: 1.  Worsening appearance to the basilar areas which could relate to increasing effusions and atelectasis. 2.  Interstitial changes within the lungs more pronounced left upper lobe that appears to reflect pneumonia based on CT chest 3.  Bandlike area of density in the right middle lobe area which could reflect atelectasis unchanged.  This report was finalized on 8/1/2022 7:50 AM by Mynor Penn MD.        Results for orders placed during the hospital encounter of 05/20/18    Adult Transthoracic Echo Complete W/ Cont if Necessary Per Protocol    Interpretation Summary  · Left ventricular systolic function is normal.  · Estimated EF appears to be in the range of 61 - 65%.  · Left ventricular diastolic dysfunction (grade I) consistent with impaired relaxation.      I have reviewed the medications:  Scheduled  Meds:apixaban, 5 mg, Oral, Q12H  budesonide, 0.5 mg, Nebulization, BID - RT  busPIRone, 7.5 mg, Oral, TID  escitalopram, 20 mg, Oral, Daily  flecainide, 50 mg, Oral, Q12H  ipratropium-albuterol, 3 mL, Nebulization, 4x Daily - RT  lactobacillus acidophilus, 1 capsule, Oral, Daily  levoFLOXacin, 750 mg, Oral, Daily  lisinopril, 5 mg, Oral, Daily  nicotine, 1 patch, Transdermal, Q24H  pharmacy consult - MTM, , Does not apply, Daily  predniSONE, 40 mg, Oral, Daily  senna-docusate sodium, 2 tablet, Oral, BID  sodium chloride, 10 mL, Intravenous, Q12H  sodium chloride, 4 mL, Nebulization, BID - RT      Continuous Infusions:   PRN Meds:.•  acetaminophen **OR** acetaminophen  •  senna-docusate sodium **AND** polyethylene glycol **AND** bisacodyl **AND** bisacodyl  •  magnesium sulfate **OR** magnesium sulfate **OR** magnesium sulfate  •  Morphine  •  potassium & sodium phosphates **OR** potassium & sodium phosphates  •  potassium chloride  •  potassium chloride  •  sodium chloride    Assessment & Plan   Assessment & Plan     Active Hospital Problems    Diagnosis  POA   • **Chronic obstructive pulmonary disease with acute exacerbation (HCC) [J44.1]  Yes   • Severe malnutrition (HCC) [E43]  Yes   • Pneumonia of left upper lobe due to infectious organism [J18.9]  Yes   • Mass of right lung -suspected based on chest x-ray. [R91.8]  Yes   • Tobacco abuse [Z72.0]  Yes   • Acute on chronic respiratory failure with hypoxia and hypercapnia (HCC) [J96.21, J96.22]  Yes      Resolved Hospital Problems   No resolved problems to display.        Brief Hospital Course to date:  Iliana Yuan is a 76 y.o. female with history of atrial fibrillation on Eliquis, COPD on 4 liters supplemental oxygen chronically, depression and spondylosis who presented with hypercapnic and hypoxemic respiratory failure (ABG 7.19/ >130/ 125). Respiratory culture ended up growing Stenotrophomonas.  Pulmonary has been following.     Hypercarbic hypoxemic  respiratory failure  COPD with AE  Pneumonia, Stenotrophomonas  Tobacco abuse  - scheduled duonebs and budesonide  - prednisone 40 mg PO daily  - levaquin x 7- 14 days  - diamox/diuresis prn  - percussion, postural drainage  - BiPAP HS, high flow oxygen during the day, may need Trilogy for home use  - goal oxygen saturation 88-92%  - smoking cessation counseling  - Pulm d/w patient- plan to transition to inpatient hospice     Possible lung mass  - lung disease too severe to proceed with any invasive study    Atrial fibrillation  - flecainide  - eliquis    Generalized weakness  - PT/OT      Expected Discharge Location and Transportation: inpatient hospice  Expected Discharge Date: 8/3    DVT prophylaxis:  Medical DVT prophylaxis orders are present.     AM-PAC 6 Clicks Score (PT): 15 (08/01/22 0800)    CODE STATUS:   Code Status and Medical Interventions:   Ordered at: 07/24/22 1617     Medical Intervention Limits:    NO intubation (DNI)     Code Status (Patient has no pulse and is not breathing):    No CPR (Do Not Attempt to Resuscitate)     Medical Interventions (Patient has pulse or is breathing):    Limited Support       Tati Mathews MD  08/02/22

## 2022-08-03 PROBLEM — J96.20 ACUTE ON CHRONIC RESPIRATORY FAILURE (HCC): Status: ACTIVE | Noted: 2022-01-01

## 2022-08-03 NOTE — PROGRESS NOTES
Continued Stay Note  [facility]     Patient Name: Iliana SÁNCHEZ Kwabena  MRN: [id number]  Today's Date: 8/3/2022    Admit Date: 7/24/2022     Discharge Plan     Row Name 08/03/22 0943       Plan    Plan Comments Hospice to meet with family in patient room at 2834. Please contact [phone] should assistance be required.               Discharge Codes    No documentation.                           Sonia Huynh RN

## 2022-08-03 NOTE — DISCHARGE SUMMARY
[facility] Medicine Services  DISCHARGE SUMMARY    Patient Name: Iliana Yuan  : 1945  MRN: [id number]    Date of Admission: 2022  3:50 PM  Date of Discharge:  8/3/2022  Primary Care Physician: Keysha Decker MD    Consults     Date and Time Order Name Status Description    2022  1:03 PM Inpatient Palliative Care MD Consult Completed           Hospital Course     Presenting Problem:   Acute on chronic respiratory failure with hypoxia and hypercapnia (HCC) [J96.21, J96.22]    Active Hospital Problems    Diagnosis  POA   • **Chronic obstructive pulmonary disease with acute exacerbation (HCC) [J44.1]  Yes   • Severe malnutrition (HCC) [E43]  Yes   • Pneumonia of left upper lobe due to infectious organism [J18.9]  Yes   • Mass of right lung -suspected based on chest x-ray. [R91.8]  Yes   • Tobacco abuse [Z72.0]  Yes   • Acute on chronic respiratory failure with hypoxia and hypercapnia (HCC) [J96.21, J96.22]  Yes      Resolved Hospital Problems   No resolved problems to display.          Hospital Course:  Iliana Yuan is a 76 y.o. female with history of atrial fibrillation on Eliquis, COPD on 4 liters supplemental oxygen chronically, depression and spondylosis who presented with hypercapnic and hypoxemic respiratory failure (ABG 7.19/ >130/ 125). Respiratory culture ended up growing Stenotrophomonas.  Pulmonary has been following. Pt has now decided to pursue comfort measures. Hospice has been consulted. Likely transition to inpatient hospice later today.      Hypercarbic hypoxemic respiratory failure  COPD with AE  Pneumonia, Stenotrophomonas  Tobacco abuse  - scheduled duonebs and budesonide  - prednisone 40 mg PO daily  - levaquin x 7- 14 days  - diamox/diuresis prn  - percussion, postural drainage  - BiPAP HS, high flow oxygen during the day- unable to wean her from HFNC. Pulm d/w patient this am and should would like to transition to Hospice services.       Possible lung mass  - lung disease too severe to proceed with any invasive study     Atrial fibrillation  - flecainide  - eliquis          Day of Discharge     HPI:   Pt sleeping initially but denies any complaints when I wake her.     Review of Systems  Gen- No fevers, chills  CV- No chest pain, palpitations  Resp-+ SOA  GI- No N/V/D, abd pain        Vital Signs:   Temp:  [97.5 °F (36.4 °C)-98.4 °F (36.9 °C)] 98.2 °F (36.8 °C)  Heart Rate:  [64-77] 71  Resp:  [16-20] 20  BP: (146-185)/(61-83) 183/80  Flow (L/min):  [50] 50      Physical Exam:  Constitutional - frail, in bed, on Bipap  HEENT-NCAT, mucous membranes moist  CV-RRR  Resp-on Bipap  Abd-soft, nontender, nondistended, normoactive bowel sounds  Ext-No lower extremity cyanosis, clubbing or edema bilaterally  Neuro-alert  Psych-flat  affect   Skin- No rash on exposed UE or LE bilaterally    Pertinent  and/or Most Recent Results     LAB RESULTS:      Lab 08/02/22  0744 08/01/22  0843 07/31/22  0648 07/30/22  0818 07/29/22  1153 07/28/22  0606   WBC 13.73* 13.76* 10.00 11.01* 14.93* 16.41*   HEMOGLOBIN 8.8* 9.2* 8.5* 8.8* 8.2* 8.9*   HEMATOCRIT 27.6* 29.0* 25.5* 27.2* 25.8* 28.5*   PLATELETS 225 229 197 208 186 184   NEUTROS ABS 11.35*  --   --   --   --   --    IMMATURE GRANS (ABS) 0.08*  --   --   --   --   --    LYMPHS ABS 0.71  --   --   --   --   --    MONOS ABS 1.52*  --   --   --   --   --    EOS ABS 0.06  --   --   --   --   --    .7* 100.3* 97.3* 100.0* 101.2* 103.3*   PROTIME  --   --  15.4* 15.1* 15.6* 14.8*         Lab 08/03/22  0521 08/02/22  0744 08/01/22  0843 07/31/22  0648 07/30/22  0818 07/29/22  1153   SODIUM 136 137 140 137 137 138   POTASSIUM 3.9 3.8 4.1 4.0 3.9 4.4   CHLORIDE 84* 89* 94* 94* 93* 95*   CO2 >50.0* 50.0* 48.0* 44.0* 41.0* 41.0*   ANION GAP  --  -2.0* -2.0* -1.0* 3.0* 2.0*   BUN 18 25* 24* 30* 26* 29*   CREATININE 0.44* 0.44* 0.49* 0.44* 0.54* 0.60   EGFR 99.8 99.8 97.2 99.8 95.0 92.6   GLUCOSE 71 82 87 106* 90 91    CALCIUM 8.5* 8.7 8.9 8.6 9.0 9.3             Lab 07/31/22  0648 07/30/22  0818 07/29/22  1153 07/28/22  0606   PROTIME 15.4* 15.1* 15.6* 14.8*   INR 1.23* 1.20* 1.24* 1.16*                 Lab 07/28/22  1015   PH, ARTERIAL 7.372   PCO2, ARTERIAL 88.8*   PO2 ART 82.2*   FIO2 60   HCO3 ART 51.5*   BASE EXCESS ART 23.2*   CARBOXYHEMOGLOBIN 1.1     Brief Urine Lab Results  (Last result in the past 365 days)      Color   Clarity   Blood   Leuk Est   Nitrite   Protein   CREAT   Urine HCG        07/24/22 2057 Yellow   Cloudy   Negative   Negative   Negative   Trace               Microbiology Results (last 10 days)     Procedure Component Value - Date/Time    Respiratory Culture - Sputum, Cough [[id number]]  (Abnormal)  (Susceptibility) Collected: 07/26/22 0412    Lab Status: Edited Result - FINAL Specimen: Sputum from Cough Updated: 07/31/22 0624     Respiratory Culture Heavy growth (4+) Stenotrophomonas maltophilia      No Normal Respiratory Rose     Gram Stain Many (4+) WBCs per low power field      Occasional Epithelial cells per low power field      Rare (1+) Gram negative bacilli    Narrative:            Susceptibility      Stenotrophomonas maltophilia      MANUEL Not Specified      Ceftazidime  Resistant  [1]      Levofloxacin Susceptible       Minocycline  Susceptible      Trimethoprim + Sulfamethoxazole Resistant                      [1]  Appended report. These results have been appended to a previously final verified report.                 Respiratory Panel PCR w/COVID-19(SARS-CoV-2) [facility] In-House, NP Swab in UTM/VTM, 3-4 HR TAT - Swab, Nasopharynx [[id number]]  (Normal) Collected: 07/25/22 0855    Lab Status: Final result Specimen: Swab from Nasopharynx Updated: 07/25/22 0956     ADENOVIRUS, PCR Not Detected     Coronavirus 229E Not Detected     Coronavirus HKU1 Not Detected     Coronavirus NL63 Not Detected     Coronavirus OC43 Not Detected     COVID19 Not Detected     Human Metapneumovirus  Not Detected     Human Rhinovirus/Enterovirus Not Detected     Influenza A PCR Not Detected     Influenza B PCR Not Detected     Parainfluenza Virus 1 Not Detected     Parainfluenza Virus 2 Not Detected     Parainfluenza Virus 3 Not Detected     Parainfluenza Virus 4 Not Detected     RSV, PCR Not Detected     Bordetella pertussis pcr Not Detected     Bordetella parapertussis PCR Not Detected     Chlamydophila pneumoniae PCR Not Detected     Mycoplasma pneumo by PCR Not Detected    Narrative:      In the setting of a positive respiratory panel with a viral infection PLUS a negative procalcitonin without other underlying concern for bacterial infection, consider observing off antibiotics or discontinuation of antibiotics and continue supportive care. If the respiratory panel is positive for atypical bacterial infection (Bordetella pertussis, Chlamydophila pneumoniae, or Mycoplasma pneumoniae), consider antibiotic de-escalation to target atypical bacterial infection.    MRSA Screen, PCR (Inpatient) - Swab, Nares [[id number]]  (Normal) Collected: 07/25/22 0813    Lab Status: Final result Specimen: Swab from Nares Updated: 07/25/22 1130     MRSA PCR Negative    Narrative:      The negative predictive value of this diagnostic test is high and should only be used to consider de-escalating anti-MRSA therapy. A positive result may indicate colonization with MRSA and must be correlated clinically.  MRSA Negative          CT Chest With Contrast Diagnostic    Result Date: 7/25/2022  DATE OF EXAM: 7/25/2022 11:04 AM  PROCEDURE: CT CHEST W CONTRAST DIAGNOSTIC-  INDICATIONS: Abnormal xray - lung nodule, >= 1 cm  COMPARISON: Concurrent portable chest radiograph  TECHNIQUE: Routine transaxial slices were obtained through the chest after the intravenous administration of 75 mL of Isovue 300. Reconstructed coronal and sagittal images were also obtained. Automated exposure control and iterative construction methods were used.  The  "radiation dose reduction device was turned on for each scan per the ALARA (As Low as Reasonably Achievable) protocol.  FINDINGS: Chest x-ray report indicates possible right hilar mass and right perihilar atelectasis, chronic changes of COPD.  The CT scan shows multinodular enlargement of the left thyroid lobe and isthmus, and normal-sized right thyroid lobe. No significant mediastinal or hilar adenopathy or mass is seen. No lower cervical or axillary adenopathy is appreciated. Patient appears cachectic. There is no pericardial effusion but there are minimal pleural effusions. There is advanced changes of COPD, and although difficult to appreciate on chest x-ray, a moderately extensive left upper lobe pneumonia. There is mild dependent atelectasis the posterior lower lobes. The patient's right hilar \"mass \"on CT scan has an appearance more typical of postinflammatory scarring, elongated and roughly wedge shaped. This does appear to have developed since 2018, when there are only chronic changes of COPD in this region. Review of CT scan appears to show multiple bronchi in the right hilum which appear partially opacified, possibly with mucin. There is no visible underlying hilar mass. There are similar areas of partially opacified left-sided bronchi as well. No other potential focus of pneumonia or other mass is seen. There is no evidence of pneumothorax.  Included images of the upper abdomen show mild diffuse fatty liver change. No significant abnormalities are seen of the spleen, included pancreatic tail, adrenal glands, or upper renal poles. Gallbladder is partially included on this study and appears distended but otherwise unremarkable. Bony structures appear to be intact.       1. Previously noted right parahilar mass has a CT scan appearance more typical for an elongated area of postinflammatory scarring or subsegmental atelectasis. CT images suggest mucinous opacification or other similar obstruction of the " underlying subsegmental bronchus and also multiple other partially opacified subsegmental bronchi. No visible hilar mass. 2. Advanced changes of COPD. 3. Moderately extensive left upper lobe airspace disease, consistent with left upper lobe pneumonia. No definite pneumonia elsewhere. 4. Very small pleural effusions and mild associated discoid atelectasis.  This report was finalized on 7/25/2022 12:33 PM by Dr. Nicola Church MD.      XR Chest 1 View    Result Date: 8/2/2022  DATE OF EXAM: 8/2/2022 4:57 AM  PROCEDURE: XR CHEST 1 VW-  INDICATIONS: PNA, COPD exacerbation  COMPARISON: 08/01/2022  TECHNIQUE: Single radiographic AP view of the chest was obtained.  FINDINGS: There remains a bandlike area of density extending out from the hilar region into the right lung that could relate to atelectasis. There are interstitial changes throughout both lungs without change. There are bibasilar densities which could reflect effusions and probably atelectasis. There are emphysematous changes noted.      1.  Overall the chest is similar to the prior day study.  This report was finalized on 8/2/2022 8:20 AM by Mynor Penn MD.      XR Chest 1 View    Result Date: 8/1/2022  DATE OF EXAM: 8/1/2022 5:01 AM  PROCEDURE: XR CHEST 1 VW-  INDICATIONS: pneumonia, COPD  COMPARISON: 07/24/2022, CT chest 07/25/2022  TECHNIQUE: Single radiographic AP view of the chest was obtained.  FINDINGS: There are bibasilar effusions. There is more of a bandlike density extending out from the right infrahilar area that could relate to some atelectasis. There are emphysematous changes. There are interstitial changes throughout the lungs that in part could reflect some chronic change. This is more pronounced in left upper lobe which could relate to an infiltrate based on prior CT of the chest.      1.  Worsening appearance to the basilar areas which could relate to increasing effusions and atelectasis. 2.  Interstitial changes within the lungs more pronounced  left upper lobe that appears to reflect pneumonia based on CT chest 3.  Bandlike area of density in the right middle lobe area which could reflect atelectasis unchanged.  This report was finalized on 8/1/2022 7:50 AM by Mynor Penn MD.      XR Chest 1 View    Result Date: 7/24/2022  DATE OF EXAM: 7/24/2022 4:28 PM  PROCEDURE: XR CHEST 1 VW-  INDICATIONS: pneumonia with COPD exacerbation  COMPARISON: 5/25/2018  TECHNIQUE: Single radiographic AP view of the chest was obtained.  FINDINGS: Heart size and pulmonary vasculature are within normal limits. The lungs are hyperinflated. There is increased density and slight distortion of the right hilum with right perihilar atelectasis. Bony changes are present in the left lung       1. COPD 2. Possible right hilar mass with right perihilar atelectasis. Recommend CT chest  This report was finalized on 7/24/2022 4:51 PM by Delroy Prescott.                Results for orders placed during the hospital encounter of 05/20/18    Adult Transthoracic Echo Complete W/ Cont if Necessary Per Protocol    Interpretation Summary  · Left ventricular systolic function is normal.  · Estimated EF appears to be in the range of 61 - 65%.  · Left ventricular diastolic dysfunction (grade I) consistent with impaired relaxation.      Plan for Follow-up of Pending Labs/Results:     Discharge Details        Discharge Medications      ASK your doctor about these medications      Instructions Start Date   apixaban 5 MG tablet tablet  Commonly known as: ELIQUIS   5 mg, Oral, Every 12 Hours Scheduled      busPIRone 15 MG tablet  Commonly known as: BUSPAR   7.5 mg, Oral, 3 Times Daily      celecoxib 100 MG capsule  Commonly known as: CeleBREX   200 mg, Oral, Daily      escitalopram 20 MG tablet  Commonly known as: LEXAPRO   20 mg, Oral, Daily      flecainide 50 MG tablet  Commonly known as: TAMBOCOR   50 mg, Oral, Every 12 Hours Scheduled      ipratropium-albuterol 0.5-2.5 mg/3 ml nebulizer  Commonly known  as: DUO-NEB   3 mL, Nebulization, Every 6 Hours      lisinopril 5 MG tablet  Commonly known as: PRINIVIL,ZESTRIL   5 mg, Oral, Daily      mirtazapine 15 MG tablet  Commonly known as: REMERON   7.5 mg, Oral, Nightly      predniSONE 5 MG tablet  Commonly known as: DELTASONE   5 mg, Oral, Daily             Allergies   Allergen Reactions   • Moxifloxacin Unknown - High Severity     Per OSH records   • Sulfadiazine Unknown - High Severity     Per OSH records         Discharge Disposition:      Diet:  Hospital:  Diet Order   Procedures   • Diet Regular; Cardiac       Activity:      Restrictions or Other Recommendations:         CODE STATUS:    Code Status and Medical Interventions:   Ordered at: 08/02/22 1050     Level Of Support Discussed With:    Patient     Code Status (Patient has no pulse and is not breathing):    No CPR (Do Not Attempt to Resuscitate)     Medical Interventions (Patient has pulse or is breathing):    Comfort Measures       No future appointments.              Tati Mathews MD  08/03/22      Time Spent on Discharge:  I spent  25  minutes on this discharge activity which included: face-to-face encounter with the patient, reviewing the data in the system, coordination of the care with the nursing staff as well as consultants, documentation, and entering orders.

## 2022-08-03 NOTE — PROGRESS NOTES
NN spoke with pt at .  Pt alert and able to answer questions appropriately. Pt O2 sat  91% on  55% HFNC currently, home O2 use  3.5-4L. Patient and family plan to meet with Hospice services today.. Deep breathing exercises encouraged. No new concerns or questions voiced at this time.  NN will continue to follow as needed.       Per current GOLD Standards, please consider: No LAMA/LABA/ICS in place, Outpatient PFT, Rehab as appropriate, Palliative Care consult, NRT at [place], Annual LDCT per current screening guidelines (age 50-80 years old, smoking history of 20 pack years or more or has quit within past 15 years)

## 2022-08-03 NOTE — PLAN OF CARE
"Goal Outcome Evaluation:  Plan of Care Reviewed With: patient        Progress: no change  Outcome Evaluation: Pt. lying in bed on HFNC very quietly during palliative nursing visit.  When asked how she was doing today she stated, \"not so great today\" but she would not elaborate on that.  She denied pain and nausea but did endorse dyspnea.  Purewick in place.  Lunch tray at  but pt. refused food at time of visit.  Pt. stated her daughter was going to see her this afternoon.  Inpatient hospice to meet with family today at 1330.  Palliative care to follow while pt. inpatient.    1330 Palliative IDT meeting: BETY Prince RN, CHPN; DIMA Conte, DENISSE, CHPN; NATE Andrea, APRN; KOKO Palumbo, ;  CAROLYN Negrete, DENISSE; SHERWIN [facility], [facility]; YEVGENIY Good, RN, CHPN        "

## 2022-08-03 NOTE — PROGRESS NOTES
Continued Stay Note  [facility]     Patient Name: Iliana SÁNCHEZ Kwabena  MRN: [id number]  Today's Date: 8/3/2022    Admit Date: 8/3/2022     Discharge Plan     Row Name 08/03/22 1533       Plan    Plan Inpatient hospice    Plan Comments Admitted to inpatient hospice services this day. Patient meets inpatient hospice criteria due to current level of care unable to be provided in alternate setting as is on high flow oxygen.  Symptoms of dyspnea and anxiety noted. Plan to wean high flow oxygen once all family members have had a chance to visit.  Care coordinated with Derek SALCEDO [facility].    Final Discharge Disposition Code 51 - hospice medical facility               Discharge Codes    No documentation.                     Sonia Huynh RN

## 2022-08-04 NOTE — PROGRESS NOTES
Continued Stay Note  [facility]     Patient Name: Iliana SÁNCHEZ Kwabena  MRN: [id number]  Today's Date: 8/4/2022    Admit Date: 8/3/2022     Discharge Plan     Row Name 08/04/22 0834       Plan    Plan Inpatient hospice    Plan Comments Patient resting with eyes closed.  Peaceful with face, jaw, and body relaxed and breathing even and unlabored.  Lethargic.  Unable to answer yes/no questions.               Discharge Codes    No documentation.                     Mis Rodriguez RN

## 2022-08-04 NOTE — PROGRESS NOTES
"                    Clinical Nutrition       Patient Name: Iliana Backn  YOB: 1945  MRN: [id number]  Date of Encounter: 08/04/22 15:33 EDT  Admission date: 8/3/2022      Reason for Visit   Follow-up protocol      ADMISSION DIAGNOSIS/ASSESSEMENT   Acute on chronic respiratory failure with hypoxia and hypercapnia    Hospital Problem List    Chronic obstructive pulmonary disease with acute exacerbation (HCC)    Acute on chronic respiratory failure with hypoxia and hypercapnia (HCC)    Tobacco abuse    Mass of right lung -suspected based on chest x-ray.     Anthropometrics:  Ht: 167.6 cm (66\")  Last Wt: 57.4 kg (126 lb 8.7 oz)  BMI: 20.42 kg/m²         Reported/Observed/Food/Nutrition Related - Comments     Pt transferred to  for Hospice services overnight.      Current Nutrition Prescription     Diet Regular; Cardiac  No active supplement orders      Average Intake from Charting: ~30 % of 5 meals recorded    Actions     Care plan reviewed    RD will sign off at this time, please consult as needed.      Beth Harp, LEDY, LD, CLC  Time Spent: 20 min        "

## 2022-08-04 NOTE — NURSING NOTE
Pt. A & O x 4. Non-tele. Oral care provided. Pain medication given. Barker in place. Transitioned to hospice care at end of day shift, Pt. Weaned off of high-flow nasal cannula according to protocol, placed on 6 L humidified NC and transferred to [address] [place] [address] at 0235. Pt. tolerated transfer well. Daughter notified of transfer.

## 2022-08-04 NOTE — PROGRESS NOTES
Daily chart review complete.  Daily rounded complete.  Patient visualized resting in bed with eyes closed.  No questions or concerns at this time.  NN continues to follow.      Per current GOLD Standards, please consider: No LAMA/LABA/ICS in place, Outpatient PFT, Rehab as appropriate, Palliative Care consult, NRT at [place], Annual LDCT per current screening guidelines (age 50-80 years old, smoking history of 20 pack years or more or has quit within past 15 years)

## 2022-08-04 NOTE — PLAN OF CARE
Goal Outcome Evaluation:  Plan of Care Reviewed With: patient           Outcome Evaluation: 76 year old white female admitted on 8/3/2022 to Hospice care, she is on day 1 of this hospitalization.  She has an IV in pace and patent.  Pastient has a foey catheter in place and patent to straight drain with adequate amount of UOP noted..  She has humidified oxygen at 6L per NC.  She was transferred from another floor and tolerated well.  Patient is [place] but easily understands.  Will continue to monitor throughout the rest of this shift.

## 2022-08-05 NOTE — PLAN OF CARE
Goal Outcome Evaluation:  Plan of Care Reviewed With: patient        Progress: no change  Outcome Evaluation: Outcome Evaluation: 76 year old white female admitted on 8/3/2022 to Hospice care, she is on day 2 of this hospitalization.  She has an IV in place and patent.  Patient has a lin catheter in place and patent to straight drain with adequate amount of UOP noted..  She has humidified oxygen at 5L per NC.  Have administered scheduled medication as indicated. Patient is [place] but easily understands.  Will continue to monitor throughout the rest of this shift

## 2022-08-05 NOTE — INTERVAL H&P NOTE
Admitted to inpatient hospice today with terminal dx of A/C respiratory failure in the setting of acute TONIA PNA with baseline COPD and finding of possible lung mass (vs RML syndrome/mucus plugging).    Full comfort focused care with inpatient hospice.  To wean from HFNC after family has had visitation time.  Prognosis days.    Mariam Arboleda MD  Hospice physician  8/3/22

## 2022-08-05 NOTE — PROGRESS NOTES
Hospice Progress Note    Patient Name: Iliana SÁNCHEZ Kwabena   : 1945  Gender: female    Code Status: comfort measures    Date of Admission: 8/3/2022    Subjective:    76 yoF admitted to inpatient hospice services on 8/3/22 for Acute on chronic respiratory failure.    Overnight notes reviewed: ... She has an IV in place and patent.  Patient has a lin catheter in place and patent to straight drain with adequate amount of UOP noted..  She has humidified oxygen at 5L per NC.  Have administered scheduled medication as indicated. Patient is [place] but easily understands.  Will continue to monitor throughout the rest of this shift    Today during visit pt is awake , laying in bed watching TV. Very [place]. She has no complaints or unmet needs. Able to feed self meals.     - Intake/Output  Intake & Output (last 3 days)        07/ 07 07 07 07 07/ 0701   0700    P.O.  120 240 337    Total Intake  120 240 337    Urine  100 1580 300    Total Output  100 1580 300    Net  +20 -1340 +37                   ROS:  Review of Systems   Unable to perform ROS: Acuity of condition       Reviewed current scheduled and prn medications for route, type, dose and frequency.     •  acetaminophen **OR** acetaminophen  •  bisacodyl  •  diazePAM  •  glycopyrrolate  •  haloperidol lactate  •  ketorolac  •  Morphine **OR** Morphine  •  palliative care oral rinse  •  Scopolamine  •  senna-docusate sodium  •  sodium chloride    Objective:   /67 (BP Location: Left arm, Patient Position: Lying)   Pulse 60   Temp 98.1 °F (36.7 °C) (Axillary)   Resp 18      PPS: Palliative Performance Scale score as of 2022, 12:15 EDT is 20% based on the following measures:   Ambulation: Totally bed bound  Activity and Evidence of Disease: Unable to do any work, extensive evidence of disease  Self-Care: Total care  Intake:Minimal sips  LOC: Full, drowsy or confusion      Physical Exam:  Physical  Exam  Vitals and nursing note reviewed.   Constitutional:       Appearance: frail, elderly  HENT:      Head: Normocephalic.      Mouth/Throat:      Mouth: Mucous membranes are moist.      Pharynx: Oropharynx is clear.   Cardiovascular:      Rate and Rhythm: Normal rate. + radial pulses  Pulmonary:      Effort: Pulmonary effort is normal. Able to speak in full sentences     Breath sounds: diminished b/l bases; inspir/expir rhonchi  Abdominal:      General: Bowel sounds are normal.      Palpations: Abdomen is soft.   Skin:     General: Skin is warm and dry.      Coloration: Pale   Psychiatric:         General: flat affect; no facial grimacing; no moaning        Acute on chronic respiratory failure (HCC)      Assessment/Plan:     76 yoF admitted to inpatient hospice services on 8/3/22 for Acute on chronic respiratory failure.    Pain  Anxiety   Restlessness   Dyspnea   Insomnia   Secretions    Continue current plan of care    Monitor for changing needs    Dtr updated by Hospice RN    Coordinated care with Hospice IDT and Nursing    Discharge Disposition: Placement - possibly rehab?    Total Visit Time: 20min  Face to Face Time: 10min    Justification for care:  Patient meets criteria for acute in-patient care with required nursing assessment and interventions for symptoms with IV medications.      Jovita Iyer, ROMAN, MHA, APRN  [facility] Nav[facility]  Hospice and Palliative Care Nurse Practitioner  08/05/22  10:35 EDT

## 2022-08-05 NOTE — H&P
Keysha Decker MD      HPI:           Past Medical History:   Diagnosis Date   • Afib (HCC)    • Chest pain    • Chronic hypercapnic respiratory failure (HCC)    • COPD (chronic obstructive pulmonary disease) (HCC)    • Depression    • Emphysema (subcutaneous) (surgical) resulting from a procedure    • Hypertension    • Spondylosis    • Tobacco abuse      Past Surgical History:   Procedure Laterality Date   • COLON SURGERY       Current Code Status     Date Active Code Status Order ID Comments User Context       8/3/2022 1454 No CPR (Do Not Attempt to Resuscitate) [id number]  Kenzie Quijano RN Inpatient     Advance Care Planning Activity      Questions for Current Code Status     Question Answer    Code Status (Patient has no pulse and is not breathing) No CPR (Do Not Attempt to Resuscitate)    Medical Interventions (Patient has pulse or is breathing) Comfort Measures    Level Of Support Discussed With Patient        Scheduled Meds:budesonide, 0.5 mg, Nebulization, BID - RT  diazePAM, 2.5 mg, Intravenous, Q8H  flecainide, 50 mg, Oral, Q12H  ipratropium-albuterol, 3 mL, Nebulization, 4x Daily - RT  Morphine, 1 mg, Intravenous, Q8H  nicotine, 1 patch, Transdermal, Q24H  palliative care oral rinse, , Mouth/Throat, TID  predniSONE, 40 mg, Oral, Daily With Breakfast      PRN Meds:.•  acetaminophen **OR** acetaminophen  •  bisacodyl  •  diazePAM  •  glycopyrrolate  •  haloperidol lactate  •  ketorolac  •  Morphine **OR** Morphine  •  palliative care oral rinse  •  Scopolamine  •  senna-docusate sodium  •  sodium chloride      Allergies   Allergen Reactions   • Moxifloxacin Unknown - High Severity     Per OSH records   • Sulfadiazine Unknown - High Severity     Per OSH records     No family history on file.  Social History     Socioeconomic History   • Marital status:    Tobacco Use   • Smoking status: Current Every Day Smoker   • Smokeless tobacco: Never Used   Substance and Sexual Activity   • Alcohol use: No    • Drug use: No     Review of Systems -       /67 (BP Location: Left arm, Patient Position: Lying)   Pulse 60   Temp 98.1 °F (36.7 °C) (Axillary)   Resp 18     Intake/Output Summary (Last 24 hours) at 8/5/2022 0822  Last data filed at 8/5/2022 0402  Gross per 24 hour   Intake 240 ml   Output 1580 ml   Net -1340 ml     Physical Exam:                   Results from last 7 days   Lab Units 08/02/22  0744   WBC 10*3/mm3 13.73*   HEMOGLOBIN g/dL 8.8*   HEMATOCRIT % 27.6*   PLATELETS 10*3/mm3 225     Results from last 7 days   Lab Units 08/03/22  0521   SODIUM mmol/L 136   POTASSIUM mmol/L 3.9   CHLORIDE mmol/L 84*   CO2 mmol/L >50.0*   BUN mg/dL 18   CREATININE mg/dL 0.44*   CALCIUM mg/dL 8.5*   GLUCOSE mg/dL 71     Results from last 7 days   Lab Units 08/03/22  0521   SODIUM mmol/L 136   POTASSIUM mmol/L 3.9   CHLORIDE mmol/L 84*   CO2 mmol/L >50.0*   BUN mg/dL 18   CREATININE mg/dL 0.44*   GLUCOSE mg/dL 71   CALCIUM mg/dL 8.5*     Imaging Results (Last 72 Hours)     ** No results found for the last 72 hours. **        Impression:           Plan:         Mariam Arboleda MD  8/4/22           Vital Signs Last 24 Hrs  T(C): 36.9 (04-12-22 @ 16:40), Max: 36.9 (04-12-22 @ 16:40)  T(F): 98.4 (04-12-22 @ 16:40), Max: 98.4 (04-12-22 @ 16:40)  HR: 80 (04-12-22 @ 16:40) (79 - 80)  BP: 92/50 (04-12-22 @ 16:40) (92/50 - 161/100)  BP(mean): --  RR: 18 (04-12-22 @ 16:40) (16 - 18)  SpO2: 97% (04-12-22 @ 16:40) (97% - 97%)     Vital Signs Last 24 Hrs  T(C): 36.9 (04-12-22 @ 16:40), Max: 36.9 (04-12-22 @ 16:40)  T(F): 98.4 (04-12-22 @ 16:40), Max: 98.4 (04-12-22 @ 16:40)  HR: 80 (04-12-22 @ 16:40) (80 - 80)  BP: 92/50 (04-12-22 @ 16:40) (92/50 - 92/50)  BP(mean): --  RR: 18 (04-12-22 @ 16:40) (18 - 18)  SpO2: 97% (04-12-22 @ 16:40) (97% - 97%)

## 2022-08-05 NOTE — PLAN OF CARE
Goal Outcome Evaluation:     Patient is managing status.  Comfort measures provided.  Pt. Appears to be absent of pain and discomfort.  Family present at bedside.  Pt is A&O X3, pleasant with RN and staff.            patient

## 2022-08-05 NOTE — PROGRESS NOTES
Daily chart review and rounding completed.  Patient resting in bed with breakfast on table.  No questions or concerns at this time.  NN continues to follow.          Per current GOLD Standards, please consider: No LAMA/LABA/ICS in place, Outpatient PFT, Rehab as appropriate, Palliative Care consult, NRT at [place], Annual LDCT per current screening guidelines (age 50-80 years old, smoking history of 20 pack years or more or has quit within past 15 years)

## 2022-08-05 NOTE — PROGRESS NOTES
Continued Stay Note  [facility]     Patient Name: Iliana SÁNCHEZ Kwabena  MRN: [id number]  Today's Date: 8/5/2022    Admit Date: 8/3/2022     Discharge Plan     Row Name 08/05/22 0843       Plan    Plan Inpatient hospice    Plan Comments Patient sleeping comfortably on RN arrival.  Face, jaw, and body relaxed.  Awakens easily.  No complaints of pain or discomfort.  No family present at time of visit.               Discharge Codes    No documentation.                     Mis Rodriguez RN

## 2022-08-06 NOTE — PROGRESS NOTES
Patient awake, reporting she is feeling short of air, no pain.  [facility] RN at bedside preparing to administer Morphine and Valium.  Family not at bedside at this time.  She has not received any PRN medications in the last 24 hours.

## 2022-08-06 NOTE — PLAN OF CARE
Goal Outcome Evaluation:  Plan of Care Reviewed With: patient        Progress: no change    Patient is resting well with no indications of pain. No PRN medications indicated this shift. Patent IV in left wrist is patent and receiving medications well. Patient has ate well. VSS, lin catheter in place and patent. Continue POC and comfort measures.

## 2022-08-06 NOTE — PLAN OF CARE
Problem: Adult Inpatient Plan of Care  Goal: Optimal Comfort and Wellbeing  Intervention: Provide Person-Centered Care  Recent Flowsheet Documentation  Taken 8/5/2022 2023 by Dalila Kirk RN  Trust Relationship/Rapport:   care explained   choices provided   reassurance provided   other (see comments)   Goal Outcome Evaluation:      Pt A&OX4 with no c/o pain. She appears to have rested well all shift. Will CTM and provide care.

## 2022-08-07 NOTE — PROGRESS NOTES
Hospice Progress Note    Patient Name: Iliana SÁNCHEZ Kwabena   : 1945  Gender: female    Code Status: comfort measures    Date of Admission: 8/3/2022    Subjective:  76 yoF admitted to inpatient hospice services on 8/3/22 for Acute on chronic respiratory failure.       - Scheduled:  Pulmicort BID  Valium 2.5 mg BID   Flecainide 50 mg BID  Duoneb QID   Morphine 1 mg TID  Nicoderm patch BID  Palliative oral rinse TID   Prednisone 40 mg daily     - PRNs:  Toradol 15 mg x 1     - Held:   None     - Intake/Output  Intake & Output (last 3 days)        07 07 07 07 07    P.O. 240 671 600     Total Intake 240 671 600     Urine 1580 1550 800     Total Output 1580 1550 800     Net -1340 -879 -200             Stool Unmeasured Occurrence    1 x             ROS:  Review of Systems   Unable to perform ROS: Acuity of condition       Reviewed current scheduled and prn medications for route, type, dose and frequency.     •  acetaminophen **OR** acetaminophen  •  bisacodyl  •  diazePAM  •  glycopyrrolate  •  haloperidol lactate  •  ketorolac  •  Morphine **OR** Morphine  •  palliative care oral rinse  •  Scopolamine  •  senna-docusate sodium  •  sodium chloride    Objective:   /67 (BP Location: Left arm, Patient Position: Lying)   Pulse 76   Temp 98.1 °F (36.7 °C) (Axillary)   Resp 17   SpO2 93%      PPS:     Physical Exam:  Physical Exam  Vitals and nursing note reviewed.   Constitutional:       Appearance: Normal appearance.   HENT:      Head: Normocephalic.      Mouth/Throat:      Mouth: Mucous membranes are moist.      Pharynx: Oropharynx is clear.   Cardiovascular:      Rate and Rhythm: Normal rate.   Pulmonary:      Effort: Pulmonary effort is normal.      Breath sounds: Wheezing present.   Abdominal:      General: Abdomen is flat. Bowel sounds are normal.   Skin:     General: Skin is warm and dry.             Acute on chronic respiratory  failure (HCC)      Assessment/Plan:   76 yoF admitted to inpatient hospice services on 8/3/22 for Acute on chronic respiratory failure.     -Continue comfort focused POC.     -Continue morphine for pain and dyspnea.     -Continue Valium scheduled and PRN for anxiety/restlessness.     -Continue to monitor pt for decline.       Symptoms:  PAIN  DYSPNEA   ANXIETY   RESTLESSNESS       Discharge Disposition: EOLC vs SNF    Total Visit Time: 20 min   Face to Face Time: 10 min     Justification for care:  Patient meets criteria for acute in-patient care with required nursing assessment and interventions for symptoms with IV medications.      Sol Mathews, MSN, APRN  [facility]  Hospice and Palliative Care Nurse Practitioner  08/07/22  13:59 EDT

## 2022-08-07 NOTE — PROGRESS NOTES
Continued Stay Note  [facility]     Patient Name: Iliana SÁNCHEZ Kwabena  MRN: [id number]  Today's Date: 8/7/2022    Admit Date: 8/3/2022     Discharge Plan     Row Name 08/07/22 0911       Plan    Plan Inpatient hospice    Plan Comments Patient sleeping comfortably.  Awakens to voice.  Takes bites of breakfast.  She appears tired and weak.  Will call daughter with an update.  No family at bedside at time of visit.               Discharge Codes    No documentation.                     Mis Rodriguez RN

## 2022-08-07 NOTE — PLAN OF CARE
Goal Outcome Evaluation:  Plan of Care Reviewed With: son        Progress: no change  Outcome Evaluation: 77 y/o F of hospice care, she is on day 7 of this hospitalization. IV in left forearm is patent, flushes easily, and is saline locked. Barker cath in place, patent to straight drain with decreased amount of urine output. Humidified O2 at 3L via NC. Patient has been increasingly tired over the last 24 hours and has consumed very little of food. Scheduled medications given, adjustments made by providor. Continue to monitor and keep comfortable.

## 2022-08-07 NOTE — PLAN OF CARE
Problem: End-of-Life Care  Goal: Comfort, Peace and Preserved Dignity  Intervention: Promote Physical Comfort  Recent Flowsheet Documentation  Taken 8/6/2022 2018 by Dalila Kirk RN  Environmental Support:  • calm environment promoted  • environmental consistency promoted  • rest periods encouraged  Intervention: Promote Peace and Maintain Dignity  Recent Flowsheet Documentation  Taken 8/6/2022 2018 by Dalila Kirk, RN  Supportive Measures:  • problem-solving facilitated  • relaxation techniques promoted  • verbalization of feelings encouraged  Intervention: Support the Grieving Process  Recent Flowsheet Documentation  Taken 8/6/2022 2018 by Dalila Kirk, RN  Family/Support System Care: support provided   Goal Outcome Evaluation:      Pt alert with difficulty falling and staying asleep. She did state she didn't feel well with no specifics. She did state she felt she needed to have a BM. Suppository given with good result and pt stated she did feel some better. UOP of only 150 ml this shift. Will CTM and provide care.

## 2022-08-07 NOTE — PROGRESS NOTES
Hospice Progress Note    Patient Name: Iliana SÁNCHEZ Kwabena   : 1945  Gender: female    Code Status: comfort measures    Date of Admission: 8/3/2022    Subjective:  76 yoF admitted to inpatient hospice services on 8/3/22 for Acute on chronic respiratory failure.    Per nursing report, pt having increased difficulty sleeping.  Staying asleep at night.  Pt had Prn dulcolax this am for constipation with good results.      Upon assessment pt denies pain or SOA today.  She appears more lethargic than yesterday, she is able to respond to questions appropriately however concern that she is likely declining toward EOLC vs discharge.      - Scheduled:  Pulmicort BID  Valium 2.5 mg BID   Flecainide 50 mg BID  Duoneb QID   Morphine 1 mg TID  Nicoderm patch BID  Palliative oral rinse TID   Prednisone 40 mg daily     - PRNs:  Dulcolax 10 mg WA daily     - Held:   None     - Intake/Output  Intake & Output (last 3 days)        07 07 07 07 07 07 0701   0700    P.O. 240 671 600     Total Intake 240 671 600     Urine 1580 1550 800     Total Output 1580 1550 800     Net -1340 -879 -200             Stool Unmeasured Occurrence    1 x             ROS:  Review of Systems   Unable to perform ROS: Acuity of condition       Reviewed current scheduled and prn medications for route, type, dose and frequency.     •  acetaminophen **OR** acetaminophen  •  bisacodyl  •  diazePAM  •  glycopyrrolate  •  haloperidol lactate  •  ketorolac  •  Morphine **OR** Morphine  •  palliative care oral rinse  •  Scopolamine  •  senna-docusate sodium  •  sodium chloride    Objective:   /67 (BP Location: Left arm, Patient Position: Lying)   Pulse 76   Temp 98.1 °F (36.7 °C) (Axillary)   Resp 17   SpO2 93%      PPS: Palliative Performance Scale score as of 2022, 14:16 EDT is 20% based on the following measures:   Ambulation: Totally bed bound  Activity and Evidence of Disease: Unable to do  any work, extensive evidence of disease  Self-Care: Total care  Intake:Minimal sips  LOC: Full, drowsy or confusion    Physical Exam:  Physical Exam  Vitals and nursing note reviewed.   Constitutional:       Appearance: She is ill-appearing.   HENT:      Head: Normocephalic.      Mouth/Throat:      Mouth: Mucous membranes are moist.      Pharynx: Oropharynx is clear.   Eyes:      General:         Right eye: Discharge present.   Cardiovascular:      Rate and Rhythm: Normal rate.   Pulmonary:      Effort: Pulmonary effort is normal.      Breath sounds: Wheezing present.   Abdominal:      General: Bowel sounds are normal.      Palpations: Abdomen is soft.   Skin:     General: Skin is warm and dry.      Coloration: Skin is pale.   Psychiatric:         Speech: Speech is delayed.         Cognition and Memory: Cognition is impaired.             Acute on chronic respiratory failure (HCC)      Assessment/Plan:   76 yoF admitted to inpatient hospice services on 8/3/22 for Acute on chronic respiratory failure.     -Continue morphine TID and PRN for pain/dyspnea.     -Continue Valium BID and PRN for anxiety/restlessness.     -Add doxepin 10 mg @ hs for sleep.     -Continue nicoderm patch BID     -Continue prednisone 40 mg Daily     -Continue nebulizer treatments scheduled and PRN for SOA.       Symptoms:  Pain  Anxiety   Restlessness   Dyspnea   Insomnia   Secretions     Discharge Disposition: EOLC vs SNF placement     Total Visit Time: 30 min   Face to Face Time: 10 min     Justification for care:  Patient meets criteria for acute in-patient care with required nursing assessment and interventions for symptoms with IV medications.      Sol Mathews, MSN, APRN  [facility]  Hospice and Palliative Care Nurse Practitioner  08/07/22  14:13 EDT

## 2022-08-08 NOTE — DISCHARGE SUMMARY
Date of Death:  8/8/2022  Time of Death:  0504    Presenting Problem/History of Present Illness    Acute on chronic respiratory failure (HCC)      Hospital Course    Iliana Yuan is a 76 y.o. female with history of atrial fibrillation on Eliquis, COPD on 4 liters supplemental oxygen chronically, depression and spondylosis who presented with hypercapnic and hypoxemic respiratory failure (ABG 7.19/ >130/ 125). Respiratory culture ended up growing Stenotrophomonas.  Pulmonary has been following. Pt has now decided to pursue comfort measures. Hospice has been consulted. Likely transition to inpatient hospice later today.      Hypercarbic hypoxemic respiratory failure  COPD with AE  Pneumonia, Stenotrophomonas  Tobacco abuse  - scheduled duonebs and budesonide  - prednisone 40 mg PO daily  - levaquin x 7- 14 days  - diamox/diuresis prn  - percussion, postural drainage  - BiPAP HS, high flow oxygen during the day- unable to wean her from HFNC. Pulm d/w patient this am and should would like to transition to Hospice services.       Social History:  Social History     Tobacco Use   • Smoking status: Current Every Day Smoker   • Smokeless tobacco: Never Used   Substance Use Topics   • Alcohol use: No         Consults:   Consults     Date and Time Order Name Status Description    8/1/2022  1:03 PM Inpatient Palliative Care MD Consult Completed         Exam confirms with auscultation zero audible heart tones and zero audible respirations. Ms.Bonnie PRINCESS Yuan was pronounced dead at 0504.  MD notified by Patient's RN.     Alethea Saunders, RN  Clinical House Supervisor  8/8/2022 05:59 EDT      Jovita Iyer, ROMAN, MHA, APRN  [facility]  Hospice and Palliative Care Nurse Practitioner  08/08/22  12:21 EDT

## 2022-08-08 NOTE — SIGNIFICANT NOTE
Exam confirms with auscultation zero audible heart tones and zero audible respirations. Ms.Bonnie SÁNCHEZ Kwabena was pronounced dead at 0504.  MD notified by Patient's RN.    Alethea Saunders RN  Clinical House Supervisor  8/8/2022 05:59 EDT

## 2022-08-08 NOTE — PLAN OF CARE
Goal Outcome Evaluation:  Plan of Care Reviewed With: patient        Progress: declining  Outcome Evaluation: Pt appears comfortable. PRN's and scheduled meds given. F/c patent. new IV established. Will continue to monitor.
